# Patient Record
Sex: FEMALE | Race: OTHER | Employment: OTHER | URBAN - METROPOLITAN AREA
[De-identification: names, ages, dates, MRNs, and addresses within clinical notes are randomized per-mention and may not be internally consistent; named-entity substitution may affect disease eponyms.]

---

## 2019-10-21 ENCOUNTER — OFFICE VISIT (OUTPATIENT)
Dept: FAMILY MEDICINE CLINIC | Age: 24
End: 2019-10-21

## 2019-10-21 VITALS
RESPIRATION RATE: 18 BRPM | OXYGEN SATURATION: 98 % | SYSTOLIC BLOOD PRESSURE: 99 MMHG | HEIGHT: 66 IN | WEIGHT: 225 LBS | BODY MASS INDEX: 36.16 KG/M2 | DIASTOLIC BLOOD PRESSURE: 67 MMHG | HEART RATE: 78 BPM | TEMPERATURE: 98.4 F

## 2019-10-21 DIAGNOSIS — E66.09 OBESITY DUE TO EXCESS CALORIES WITHOUT SERIOUS COMORBIDITY, UNSPECIFIED CLASSIFICATION: ICD-10-CM

## 2019-10-21 DIAGNOSIS — J06.9 VIRAL URI: Primary | ICD-10-CM

## 2019-10-21 DIAGNOSIS — Z76.89 ESTABLISHING CARE WITH NEW DOCTOR, ENCOUNTER FOR: ICD-10-CM

## 2019-10-21 NOTE — PATIENT INSTRUCTIONS
Claritin 1 tab daily. Flonase 1 spray in each nostril daily. Mucinex 1 tab daily. Drink hot water with local honey melted in before bed. Viral Respiratory Infection: Care Instructions  Your Care Instructions    Viruses are very small organisms. They grow in number after they enter your body. There are many types that cause different illnesses, such as colds and the mumps. The symptoms of a viral respiratory infection often start quickly. They include a fever, sore throat, and runny nose. You may also just not feel well. Or you may not want to eat much. Most viral respiratory infections are not serious. They usually get better with time and self-care. Antibiotics are not used to treat a viral infection. That's because antibiotics will not help cure a viral illness. In some cases, antiviral medicine can help your body fight a serious viral infection. Follow-up care is a key part of your treatment and safety. Be sure to make and go to all appointments, and call your doctor if you are having problems. It's also a good idea to know your test results and keep a list of the medicines you take. How can you care for yourself at home? · Rest as much as possible until you feel better. · Be safe with medicines. Take your medicine exactly as prescribed. Call your doctor if you think you are having a problem with your medicine. You will get more details on the specific medicine your doctor prescribes. · Take an over-the-counter pain medicine, such as acetaminophen (Tylenol), ibuprofen (Advil, Motrin), or naproxen (Aleve), as needed for pain and fever. Read and follow all instructions on the label. Do not give aspirin to anyone younger than 20. It has been linked to Reye syndrome, a serious illness. · Drink plenty of fluids, enough so that your urine is light yellow or clear like water. Hot fluids, such as tea or soup, may help relieve congestion in your nose and throat.  If you have kidney, heart, or liver disease and have to limit fluids, talk with your doctor before you increase the amount of fluids you drink. · Try to clear mucus from your lungs by breathing deeply and coughing. · Gargle with warm salt water once an hour. This can help reduce swelling and throat pain. Use 1 teaspoon of salt mixed in 1 cup of warm water. · Do not smoke or allow others to smoke around you. If you need help quitting, talk to your doctor about stop-smoking programs and medicines. These can increase your chances of quitting for good. To avoid spreading the virus  · Cough or sneeze into a tissue. Then throw the tissue away. · If you don't have a tissue, use your hand to cover your cough or sneeze. Then clean your hand. You can also cough into your sleeve. · Wash your hands often. Use soap and warm water. Wash for 15 to 20 seconds each time. · If you don't have soap and water near you, you can clean your hands with alcohol wipes or gel. When should you call for help? Call your doctor now or seek immediate medical care if:    · You have a new or higher fever.     · Your fever lasts more than 48 hours.     · You have trouble breathing.     · You have a fever with a stiff neck or a severe headache.     · You are sensitive to light.     · You feel very sleepy or confused.    Watch closely for changes in your health, and be sure to contact your doctor if:    · You do not get better as expected. Where can you learn more? Go to http://adelita-leena.info/. Enter I053 in the search box to learn more about \"Viral Respiratory Infection: Care Instructions. \"  Current as of: June 9, 2019  Content Version: 12.2  © 1559-0696 [employer]. Care instructions adapted under license by [employer] (which disclaims liability or warranty for this information).  If you have questions about a medical condition or this instruction, always ask your healthcare professional. Pablo Sims any warranty or liability for your use of this information.

## 2019-10-22 LAB
ALBUMIN SERPL-MCNC: 4.2 G/DL (ref 3.5–5.5)
ALBUMIN/GLOB SERPL: 1.2 {RATIO} (ref 1.2–2.2)
ALP SERPL-CCNC: 132 IU/L (ref 39–117)
ALT SERPL-CCNC: 17 IU/L (ref 0–32)
AST SERPL-CCNC: 21 IU/L (ref 0–40)
BILIRUB SERPL-MCNC: 0.3 MG/DL (ref 0–1.2)
BUN SERPL-MCNC: 11 MG/DL (ref 6–20)
BUN/CREAT SERPL: 14 (ref 9–23)
CALCIUM SERPL-MCNC: 9.6 MG/DL (ref 8.7–10.2)
CHLORIDE SERPL-SCNC: 102 MMOL/L (ref 96–106)
CHOLEST SERPL-MCNC: 138 MG/DL (ref 100–199)
CO2 SERPL-SCNC: 25 MMOL/L (ref 20–29)
CREAT SERPL-MCNC: 0.78 MG/DL (ref 0.57–1)
ERYTHROCYTE [DISTWIDTH] IN BLOOD BY AUTOMATED COUNT: 12.4 % (ref 12.3–15.4)
EST. AVERAGE GLUCOSE BLD GHB EST-MCNC: 97 MG/DL
GLOBULIN SER CALC-MCNC: 3.4 G/DL (ref 1.5–4.5)
GLUCOSE SERPL-MCNC: 82 MG/DL (ref 65–99)
HBA1C MFR BLD: 5 % (ref 4.8–5.6)
HCT VFR BLD AUTO: 42.3 % (ref 34–46.6)
HDLC SERPL-MCNC: 36 MG/DL
HGB BLD-MCNC: 14 G/DL (ref 11.1–15.9)
INTERPRETATION, 910389: NORMAL
LDLC SERPL CALC-MCNC: 81 MG/DL (ref 0–99)
MCH RBC QN AUTO: 27.4 PG (ref 26.6–33)
MCHC RBC AUTO-ENTMCNC: 33.1 G/DL (ref 31.5–35.7)
MCV RBC AUTO: 83 FL (ref 79–97)
PLATELET # BLD AUTO: 354 X10E3/UL (ref 150–450)
POTASSIUM SERPL-SCNC: 5 MMOL/L (ref 3.5–5.2)
PROT SERPL-MCNC: 7.6 G/DL (ref 6–8.5)
RBC # BLD AUTO: 5.11 X10E6/UL (ref 3.77–5.28)
SODIUM SERPL-SCNC: 139 MMOL/L (ref 134–144)
TRIGL SERPL-MCNC: 106 MG/DL (ref 0–149)
VLDLC SERPL CALC-MCNC: 21 MG/DL (ref 5–40)
WBC # BLD AUTO: 9 X10E3/UL (ref 3.4–10.8)

## 2019-10-26 DIAGNOSIS — R74.8 ELEVATED ALKALINE PHOSPHATASE LEVEL: Primary | ICD-10-CM

## 2019-11-26 ENCOUNTER — HOSPITAL ENCOUNTER (OUTPATIENT)
Dept: LAB | Age: 24
Discharge: HOME | End: 2019-11-26

## 2019-11-26 ENCOUNTER — LAB ONLY (OUTPATIENT)
Dept: FAMILY MEDICINE CLINIC | Age: 24
End: 2019-11-26

## 2019-11-26 DIAGNOSIS — R74.8 ELEVATED ALKALINE PHOSPHATASE LEVEL: ICD-10-CM

## 2019-11-26 LAB
ALBUMIN SERPL-MCNC: 3.5 G/DL (ref 3.5–5)
ALBUMIN/GLOB SERPL: 0.9 {RATIO} (ref 1.1–2.2)
ALP SERPL-CCNC: 138 U/L (ref 45–117)
ALT SERPL-CCNC: 35 U/L (ref 12–78)
ANION GAP SERPL CALC-SCNC: 5 MMOL/L (ref 5–15)
AST SERPL-CCNC: 23 U/L (ref 15–37)
BILIRUB SERPL-MCNC: 0.4 MG/DL (ref 0.2–1)
BUN SERPL-MCNC: 10 MG/DL (ref 6–20)
BUN/CREAT SERPL: 15 (ref 12–20)
CALCIUM SERPL-MCNC: 9.2 MG/DL (ref 8.5–10.1)
CHLORIDE SERPL-SCNC: 105 MMOL/L (ref 97–108)
CO2 SERPL-SCNC: 28 MMOL/L (ref 21–32)
CREAT SERPL-MCNC: 0.67 MG/DL (ref 0.55–1.02)
GLOBULIN SER CALC-MCNC: 4.1 G/DL (ref 2–4)
GLUCOSE SERPL-MCNC: 84 MG/DL (ref 65–100)
POTASSIUM SERPL-SCNC: 4 MMOL/L (ref 3.5–5.1)
PROT SERPL-MCNC: 7.6 G/DL (ref 6.4–8.2)
SODIUM SERPL-SCNC: 138 MMOL/L (ref 136–145)

## 2019-12-24 ENCOUNTER — OFFICE VISIT (OUTPATIENT)
Dept: FAMILY MEDICINE CLINIC | Age: 24
End: 2019-12-24

## 2019-12-24 ENCOUNTER — HOSPITAL ENCOUNTER (OUTPATIENT)
Dept: LAB | Age: 24
Discharge: HOME | End: 2019-12-24
Payer: OTHER GOVERNMENT

## 2019-12-24 ENCOUNTER — HOSPITAL ENCOUNTER (OUTPATIENT)
Dept: LAB | Age: 24
Discharge: HOME | End: 2019-12-24

## 2019-12-24 VITALS
HEART RATE: 87 BPM | RESPIRATION RATE: 18 BRPM | DIASTOLIC BLOOD PRESSURE: 70 MMHG | SYSTOLIC BLOOD PRESSURE: 103 MMHG | HEIGHT: 66 IN | BODY MASS INDEX: 36.8 KG/M2 | OXYGEN SATURATION: 98 % | WEIGHT: 229 LBS | TEMPERATURE: 98.6 F

## 2019-12-24 DIAGNOSIS — Z23 ENCOUNTER FOR IMMUNIZATION: ICD-10-CM

## 2019-12-24 DIAGNOSIS — R74.8 ELEVATED ALKALINE PHOSPHATASE LEVEL: ICD-10-CM

## 2019-12-24 DIAGNOSIS — E66.9 OBESITY, UNSPECIFIED CLASSIFICATION, UNSPECIFIED OBESITY TYPE, UNSPECIFIED WHETHER SERIOUS COMORBIDITY PRESENT: ICD-10-CM

## 2019-12-24 DIAGNOSIS — Z01.419 WELL WOMAN EXAM WITH ROUTINE GYNECOLOGICAL EXAM: Primary | ICD-10-CM

## 2019-12-24 DIAGNOSIS — Z11.3 SCREENING FOR STD (SEXUALLY TRANSMITTED DISEASE): ICD-10-CM

## 2019-12-24 LAB
25(OH)D3 SERPL-MCNC: 13.5 NG/ML (ref 30–100)
ALP SERPL-CCNC: 142 U/L (ref 45–117)
CALCIUM SERPL-MCNC: 9.3 MG/DL (ref 8.5–10.1)
COMMENT, HOLDF: NORMAL
HBV SURFACE AG SER QL: <0.1 INDEX
HBV SURFACE AG SER QL: NEGATIVE
HIV 1+2 AB+HIV1 P24 AG SERPL QL IA: NONREACTIVE
HIV12 RESULT COMMENT, HHIVC: NORMAL
PTH-INTACT SERPL-MCNC: 54.8 PG/ML (ref 18.4–88)
RPR SER-TITR: ABNORMAL {TITER}
SAMPLES BEING HELD,HOLD: NORMAL
TSH SERPL DL<=0.05 MIU/L-ACNC: 2.71 UIU/ML (ref 0.36–3.74)

## 2019-12-24 PROCEDURE — 88175 CYTOPATH C/V AUTO FLUID REDO: CPT

## 2019-12-24 NOTE — PROGRESS NOTES
Identified Patient with two Patient identifiers (Name and ). Two Patient Identifiers confirmed. Reviewed record in preparation for visit and have obtained necessary documentation. Chief Complaint   Patient presents with    Well Woman     patient due for pap       Visit Vitals  /70 (BP 1 Location: Right arm, BP Patient Position: Sitting)   Pulse 87   Temp 98.6 °F (37 °C) (Oral)   Resp 18   Ht 5' 6\" (1.676 m)   Wt 229 lb (103.9 kg)   SpO2 98%   BMI 36.96 kg/m²       1. Have you been to the ER, urgent care clinic since your last visit? Hospitalized since your last visit? No    2. Have you seen or consulted any other health care providers outside of the [address] [place] [address] since your last visit? Include any pap smears or colon screening.  No

## 2019-12-24 NOTE — PROGRESS NOTES
HPI:  Leo Chavez is a 25 y.o. female presenting for well woman exam.     Anal pain x 3days. Had diarrhea x2 of those days. Thinks it was something she had to eat. No blood in the stool. Resolved yesterday. No pain since. Has had elevated alk phos this year. Had cholecystectomy for gallbladder stones about 1 year ago. 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  dicyclomine (BENTYL) 20 mg tablet Take 1 Tab by mouth every six (6) hours as needed (abdominal cramps) for up to 20 doses. No current facility-administered medications for this visit. Past Medical History- reviewed:  History reviewed. No pertinent past medical history.     Past Surgical History- reviewed:   Past Surgical History:   Procedure Laterality Date    HX WISDOM TEETH EXTRACTION  12/2011     Family History - reviewed:  Family History   Problem Relation Age of Onset    Diabetes Mother     Heart Disease Maternal Aunt      Social History - reviewed:  Social History     Socioeconomic History    Marital status:      Spouse name: Not on file    Number of children: Not on file    Years of education: Not on file    Highest education level: Not on file   Occupational History    Not on file   Social Needs    Financial resource strain: Not on file    Food insecurity:     Worry: Not on file     Inability: Not on file    Transportation needs:     Medical: Not on file     Non-medical: Not on file   Tobacco Use    Smoking status: Never Smoker    Smokeless tobacco: Never Used   Substance and Sexual Activity    Alcohol use: No    Drug use: No    Sexual activity: Yes     Partners: Male   Lifestyle    Physical activity:     Days per week: Not on file     Minutes per session: Not on file    Stress: Not on file   Relationships    Social connections:     Talks on phone: Not on file     Gets together: Not on file     Attends Christianity service: Not on file     Active member of club or organization: Not on file     Attends meetings of clubs or organizations: Not on file     Relationship status: Not on file    Intimate partner violence:     Fear of current or ex partner: Not on file     Emotionally abused: Not on file     Physically abused: Not on file     Forced sexual activity: Not on file   Other Topics Concern    Not on file   Social History Narrative    Not on file     Immunizations - reviewed:   Immunization History   Administered Date(s) Administered    Hepatitis B Vaccine 10/06/2010    Influenza Nasal Vaccine 09/23/2019    Influenza Vaccine Split 10/06/2010, 11/14/2011    TDAP Vaccine 02/01/2009     Flu: Done  Tdap: Due today  Pneumovax: NI  Zostervax: NI    Health Maintenance reviewed -  Pap smear 22 YO normal, due again  Mammogram no family hx, NI  Colonoscopy Dad had colon cancer in early 46s, NI yet but will figure out exact age to start early screening  DEXA scan NI  HIV testing Interested  Hepatitis C testing [place]  Lung cancer screening NI    Physical Exam  Visit Vitals  /70 (BP 1 Location: Right arm, BP Patient Position: Sitting)   Pulse 87   Temp 98.6 °F (37 °C) (Oral)   Resp 18   Ht 5' 6\" (1.676 m)   Wt 229 lb (103.9 kg)   LMP 12/10/2019 (Exact Date)   SpO2 98%   BMI 36.96 kg/m²     General appearance - WNWD, NAD  Eyes -no scleral icterus  HEENT - TMs WNL, no rhinorrhea, moist mucosa, pharynx nml  Neck - no cervicalLAD  Chest - CTA, no wheezes/ ronchi/ rales  Heart - RRR, no murmurs  Abdomen - soft, non tender, no guarding or abnormalities  Neurological - no focal deficits  Extremities -no edema  Skin - no jaundice  Pelvic - Exam chaperoned by Sarah Rose LPN. External genitalia normal without rashes or lesions. Pink and moist vaginal mucosa. Scant white discharge. Cervix without lesions or abnormal discharge. Uterus non tender and normal size. No adnexal masses or tenderness. Assessment/Plan:    ICD-10-CM ICD-9-CM    1. Well woman exam with routine gynecological exam Z01.419 V72.31 PAP IG, RFX APTIMA HPV ASCUS ([id number]))   2. Elevated alkaline phosphatase level R74.8 790.5 TSH 3RD GENERATION      GGT      VITAMIN D, 25 HYDROXY      ALK PHOS      PTH INTACT      AMB POC URINE PREGNANCY TEST, VISUAL COLOR COMPARISON      CANCELED: ALK PHOS   3. Screening for STD (sexually transmitted disease) Z11.3 V74.5 HIV 1/2 AG/AB, 4TH GENERATION,W RFLX CONFIRM      HEP B SURFACE AG      HEPATITIS C AB      CHLAMYDIA/GC PCR   4. Obesity, unspecified classification, unspecified obesity type, unspecified whether serious comorbidity present E66.9 278.00      Well Woman   - PAP today  - Desires STD testing - HepB/ C, RPR, HIV, RPR, G/C  - Elevated Alk Phos - Check UPT, TSH, PTH, Vit D, GGT, Alk Phos again  - Obesity - Discussed diet/ lifestyle modifications  - Tdap today (need more complete records to see if she has had HPV)    I have discussed the diagnosis with the patient and the intended plan as seen in the above orders. Patient verbalized understanding of the plan and agrees with the plan. The patient has received an after-visit summary and questions were answered concerning future plans.   I have discussed medication side effects and warnings with the patient as well. Informed patient to return to the office if new symptoms arise.     Patient discussed with Dr. Helen Chin MD (Attending)    Amita Combs DO  Family Medicine Resident

## 2019-12-25 LAB
HCV AB SERPL QL IA: NONREACTIVE
HCV COMMENT,HCGAC: NORMAL

## 2019-12-26 LAB
RPR SER QL: REACTIVE
T PALLIDUM AB SER QL IA: NON REACTIVE

## 2019-12-26 NOTE — PROGRESS NOTES
Hep B/C neg, HIV neg, RPR positive awaiting treponema testing for confirmation, all phos higher but stable, Vitamin d low, pth/ calcium WNL, TSH WNL

## 2019-12-28 LAB
C TRACH RRNA SPEC QL NAA+PROBE: POSITIVE
N GONORRHOEA RRNA SPEC QL NAA+PROBE: NEGATIVE
SPECIMEN SOURCE: ABNORMAL

## 2019-12-30 ENCOUNTER — TELEPHONE (OUTPATIENT)
Dept: FAMILY MEDICINE CLINIC | Age: 24
End: 2019-12-30

## 2019-12-30 RX ORDER — ACETAMINOPHEN 500 MG
2000 TABLET ORAL DAILY
Qty: 30 CAP | Refills: 2 | Status: SHIPPED | OUTPATIENT
Start: 2019-12-30 | End: 2020-09-14 | Stop reason: SDUPTHER

## 2019-12-30 RX ORDER — AZITHROMYCIN 500 MG/1
1000 TABLET, FILM COATED ORAL ONCE
Qty: 2 TAB | Refills: 0 | Status: SHIPPED | OUTPATIENT
Start: 2019-12-30 | End: 2019-12-30

## 2019-12-30 NOTE — TELEPHONE ENCOUNTER
12/30/2019 1:51 PM    Called patient to discuss positive Chlamydia testing. Discussed this is a reportable disease to health department. Instructed to take Azithro and needs to have other partners tested and treated (she is ). Discussed false positive RPR. May need repeat Tpal. Would recommend investigation of this further with HILDA, etc. Will schedule appt with . Discussed low Vitamin D. Will treat. Needs rechecked in 6 weeks.      Nohemy Staley, DO

## 2020-01-05 NOTE — PROGRESS NOTES
HPI       Chief Complaint: Discuss lab results     Tejal Shea is a 25 y.o. female who presents to discuss lab results done during routine well woman exam.     Chlamydia positive - rx'd izaiahro on 12/30. Pt reports her  was also tested for full panel of STDs including chlamydia and was negative. Pt denies prior history of STD. RPR positive (nontreponemal) - but Tpall (treponema confirmation testing) negative. Pt denies any sexual partners besides her . Per UTD algorithm, can consider investigating for causes of false-positive non-treponemal result (which include recent infection, pregnancy, HIV, chronic liver disease, autoimmune diseases, etc). Pt reports she had had pneumonia within a month of the labs and a UTI at the time of the lab draws. No family hx of any rheumatologic disorders. Does have chronically elevated alk phos, and chronic liver disease can predispose to false-positive nontreponemal tests. No rashes, joint pains, photosensitivity, fever, LE swelling but does endorse fatigue, hair loss. Pt does have persistently elevated alk phos noted since 10/2019. PMHx:  No past medical history on file. Meds:   Current Outpatient Medications   Medication Sig Dispense Refill    cholecalciferol (VITAMIN D3) (2,000 UNITS /50 MCG) cap capsule Take 2,000 Units by mouth daily. 30 Cap 2    ondansetron (ZOFRAN ODT) 4 mg disintegrating tablet Take 1 Tab by mouth every eight (8) hours as needed for Nausea. 10 Tab 0    dicyclomine (BENTYL) 20 mg tablet Take 1 Tab by mouth every six (6) hours as needed (abdominal cramps) for up to 20 doses. 20 Tab 0     Allergies: Allergies   Allergen Reactions    Motrin [Ibuprofen] Hives    Nsaids (Non-Steroidal Anti-Inflammatory Drug) Hives     ROS  Review of Systems   Constitutional: Positive for malaise/fatigue. Negative for chills and fever. Respiratory: Negative for shortness of breath and wheezing.     Cardiovascular: Negative for chest pain and palpitations. Gastrointestinal: Negative for abdominal pain, constipation, diarrhea, nausea and vomiting. Genitourinary: Negative for dysuria, frequency and urgency. Neurological: Negative for dizziness, weakness and headaches. Physical Exam:  Visit Vitals  /72   Pulse 85   Temp 99 °F (37.2 °C) (Oral)   Resp 16   Ht 5' 6\" (1.676 m)   Wt 230 lb (104.3 kg)   LMP 12/10/2019 (Exact Date)   SpO2 97%   BMI 37.12 kg/m²       Wt Readings from Last 3 Encounters:   01/06/20 230 lb (104.3 kg)   12/24/19 229 lb (103.9 kg)   10/21/19 225 lb (102.1 kg)     BP Readings from Last 3 Encounters:   01/06/20 100/72   12/24/19 103/70   10/21/19 99/67      Physical Exam  Vitals signs reviewed. Constitutional:       Appearance: She is well-developed. HENT:      Head: Normocephalic and atraumatic. Neck:      Musculoskeletal: Normal range of motion and neck supple. Thyroid: No thyromegaly. Cardiovascular:      Rate and Rhythm: Normal rate and regular rhythm. Heart sounds: No murmur. Pulmonary:      Effort: Pulmonary effort is normal. No respiratory distress. Breath sounds: Normal breath sounds. No wheezing or rales. Abdominal:      General: Bowel sounds are normal. There is no distension. Palpations: Abdomen is soft. Tenderness: There is no tenderness. Skin:     General: Skin is warm and dry. Neurological:      Mental Status: She is alert and oriented to person, place, and time. I personally reviewed the following lab results:   Recent Results (from the past 12 hour(s))   AMB POC URINE PREGNANCY TEST, VISUAL COLOR COMPARISON    Collection Time: 01/06/20  4:11 PM   Result Value Ref Range    VALID INTERNAL CONTROL POC Yes     HCG urine, Ql. (POC) Negative Negative             Assessment     25 y.o. female with:    ICD-10-CM ICD-9-CM    1.  Abnormal laboratory test result R89.9 796.4 AMB POC URINE PREGNANCY TEST, VISUAL COLOR COMPARISON   2. Elevated alkaline phosphatase level R74.8 790.5               Plan     1. Abnormal laboratory test result  Discussed the false-positive non-treponemal test for syphilis. Discussed repeating the RPR in 6 mos vs investigating other causes for false-positive today. Other etiologies for false-positive include pregnancy (UPT neg today), acute event/infection (pt had UTI at time of lab draw and had recently had PNA), recent immunization (had flu vaccine 3 mos prior), autoimmune disorders (no family hx or personal hx), IV drug use (no hx), HIV (pt neg), or chronic liver disease. Pt does have elevated alk phos (See below). Plan to recheck RPR in 6 mos. - AMB POC URINE PREGNANCY TEST, VISUAL COLOR COMPARISON    2. Elevated alkaline phosphatase level  Will have GGT (ordered previously by Dr. Sangeeta Brown) drawn today. Hep B and C recently negative. Will plan to recheck CMP in about 1 mo. Consider US/hep panel if still elevated and GGT points to hepatic etiology. Follow-up and Dispositions    · Return in about 3 months (around 4/6/2020), or repeat liver enzymes . Patient discussed with Dr. Pippa Lee (Attending Physician)    I have discussed the diagnosis with the patient and the intended plan as seen in the above orders. The patient has received an after-visit summary and questions were answered concerning future plans. I have discussed medication side effects and warnings with the patient as well.     Emy Gomes MD  Family Medicine Resident  PGY-3

## 2020-01-06 ENCOUNTER — HOSPITAL ENCOUNTER (OUTPATIENT)
Dept: LAB | Age: 25
Discharge: HOME | End: 2020-01-06

## 2020-01-06 ENCOUNTER — OFFICE VISIT (OUTPATIENT)
Dept: FAMILY MEDICINE CLINIC | Age: 25
End: 2020-01-06

## 2020-01-06 VITALS
HEIGHT: 66 IN | TEMPERATURE: 99 F | BODY MASS INDEX: 36.96 KG/M2 | HEART RATE: 85 BPM | RESPIRATION RATE: 16 BRPM | DIASTOLIC BLOOD PRESSURE: 72 MMHG | SYSTOLIC BLOOD PRESSURE: 100 MMHG | OXYGEN SATURATION: 97 % | WEIGHT: 230 LBS

## 2020-01-06 DIAGNOSIS — R89.9 ABNORMAL LABORATORY TEST RESULT: Primary | ICD-10-CM

## 2020-01-06 DIAGNOSIS — R74.8 ELEVATED ALKALINE PHOSPHATASE LEVEL: ICD-10-CM

## 2020-01-06 LAB
HCG URINE, QL. (POC): NEGATIVE
VALID INTERNAL CONTROL?: YES

## 2020-01-06 NOTE — PROGRESS NOTES
Chief Complaint   Patient presents with    Abnormal Lab Results     1. Have you been to the ER, urgent care clinic since your last visit? Hospitalized since your last visit? No    2. Have you seen or consulted any other health care providers outside of the [address] [place] [address] since your last visit? Include any pap smears or colon screening.  No

## 2020-01-07 LAB — GGT SERPL-CCNC: 43 U/L (ref 5–55)

## 2020-01-15 ENCOUNTER — TELEPHONE (OUTPATIENT)
Dept: FAMILY MEDICINE CLINIC | Age: 25
End: 2020-01-15

## 2020-01-15 NOTE — TELEPHONE ENCOUNTER
[phone]    Patient called to say she had appt of 1/6 at [facility] and they told her the vitamin D level was 17.9. She said they are not sending the doctor  here a report. She said the  told her to call the doctor here for advice for the next step.

## 2020-01-15 NOTE — TELEPHONE ENCOUNTER
Spoke with Ms. Goncalves [place], she will come by office tomorrow to fill out release of information.

## 2020-06-18 ENCOUNTER — APPOINTMENT (OUTPATIENT)
Dept: GENERAL RADIOLOGY | Age: 25
End: 2020-06-18
Attending: EMERGENCY MEDICINE
Payer: OTHER GOVERNMENT

## 2020-06-18 ENCOUNTER — HOSPITAL ENCOUNTER (EMERGENCY)
Age: 25
Discharge: HOME | End: 2020-06-18
Attending: EMERGENCY MEDICINE
Payer: OTHER GOVERNMENT

## 2020-06-18 VITALS
WEIGHT: 230 LBS | HEIGHT: 65 IN | RESPIRATION RATE: 20 BRPM | DIASTOLIC BLOOD PRESSURE: 83 MMHG | SYSTOLIC BLOOD PRESSURE: 122 MMHG | TEMPERATURE: 98.3 F | OXYGEN SATURATION: 98 % | BODY MASS INDEX: 38.32 KG/M2 | HEART RATE: 90 BPM

## 2020-06-18 DIAGNOSIS — R05.9 COUGH: Primary | ICD-10-CM

## 2020-06-18 PROCEDURE — 71046 X-RAY EXAM CHEST 2 VIEWS: CPT

## 2020-06-18 PROCEDURE — 99281 EMR DPT VST MAYX REQ PHY/QHP: CPT

## 2020-06-18 NOTE — ED TRIAGE NOTES
Patient was exposed to rat feces, someone vacuumed them up and they were in th eair, now she is experiencing congestion and cough

## 2020-06-18 NOTE — ED PROVIDER NOTES
HPI patient is a 58-year-old obese female who presents to the ED reporting chest discomfort cough and chest congestion for 2 days after having a friend move out of her apartment. She states she was exposed to cat feces and is concerned that that is what is causing her symptoms. Denies fever, cold symptoms, headache, neck pain, visual changes, focal weakness or rash. Denies any  difficulty breathing, difficulty swallowing, SOB or abdominal pain. Denies any nausea, vomiting or diarrhea. Pt. Reports taking Zyrtec and Mucinex without relief. No past medical history on file.     Past Surgical History:   Procedure Laterality Date    HX WISDOM TEETH EXTRACTION  12/2011         Family History:   Problem Relation Age of Onset    Diabetes Mother     Heart Disease Maternal Aunt        Social History     Socioeconomic History    Marital status:      Spouse name: Not on file    Number of children: Not on file    Years of education: Not on file    Highest education level: Not on file   Occupational History    Not on file   Social Needs    Financial resource strain: Not on file    Food insecurity     Worry: Not on file     Inability: Not on file    Transportation needs     Medical: Not on file     Non-medical: Not on file   Tobacco Use    Smoking status: Never Smoker    Smokeless tobacco: Never Used   Substance and Sexual Activity    Alcohol use: No    Drug use: No    Sexual activity: Yes     Partners: Male   Lifestyle    Physical activity     Days per week: Not on file     Minutes per session: Not on file    Stress: Not on file   Relationships    Social connections     Talks on phone: Not on file     Gets together: Not on file     Attends [employer] service: Not on file     Active member of club or organization: Not on file     Attends meetings of clubs or organizations: Not on file     Relationship status: Not on file    Intimate partner violence     Fear of current or ex partner: Not on file Emotionally abused: Not on file     Physically abused: Not on file     Forced sexual activity: Not on file   Other Topics Concern    Not on file   Social History Narrative    Not on file         ALLERGIES: Motrin [ibuprofen] and Nsaids (non-steroidal anti-inflammatory drug)    Review of Systems   Constitutional: Negative for activity change, appetite change, fever and unexpected weight change. HENT: Positive for congestion. Negative for rhinorrhea, sore throat and trouble swallowing. Eyes: Negative for visual disturbance. Respiratory: Positive for cough. Negative for choking, chest tightness and shortness of breath. Cardiovascular: Positive for chest pain. Negative for palpitations and leg swelling. Gastrointestinal: Negative for abdominal pain, diarrhea, nausea and vomiting. Genitourinary: Negative for dysuria and flank pain. Musculoskeletal: Negative for arthralgias, back pain, joint swelling, myalgias and neck pain. Skin: Negative for rash. Neurological: Negative for dizziness, light-headedness and headaches. All other systems reviewed and are negative. Vitals:    06/18/20 1030   BP: 122/83   Pulse: 90   Resp: 20   Temp: 98.3 °F (36.8 °C)   SpO2: 98%   Weight: 104.3 kg (230 lb)   Height: 5' 5\" (1.651 m)            Physical Exam  Vitals signs and nursing note reviewed. Constitutional:       General: She is not in acute distress. Appearance: Normal appearance. She is not ill-appearing, toxic-appearing or diaphoretic. HENT:      Head: Normocephalic and atraumatic. Right Ear: Tympanic membrane normal.      Left Ear: Tympanic membrane normal.      Nose: Nose normal. No rhinorrhea. Mouth/Throat:      Mouth: Mucous membranes are moist.      Pharynx: No posterior oropharyngeal erythema. Neck:      Musculoskeletal: Normal range of motion and neck supple. Cardiovascular:      Rate and Rhythm: Normal rate and regular rhythm.    Pulmonary:      Effort: Pulmonary effort is normal.      Breath sounds: Normal breath sounds. Chest:      Chest wall: No tenderness. Abdominal:      General: Bowel sounds are normal.      Palpations: Abdomen is soft. Tenderness: There is no abdominal tenderness. There is no guarding or rebound. Musculoskeletal: Normal range of motion. General: No tenderness. Lymphadenopathy:      Cervical: No cervical adenopathy. Skin:     General: Skin is warm and dry. Findings: No rash. Neurological:      General: No focal deficit present. Mental Status: She is alert and oriented to person, place, and time. Psychiatric:         Mood and Affect: Mood normal.         Behavior: Behavior normal.          MDM       Procedures      Patient has been reexamined and denies any complaints of pain or discomfort. Recommend close follow-up with PCP if symptoms persist.  4:27 PM  Patient's results and plan of care have been reviewed with her. Patient has verbally conveyed her understanding and agreement of her signs, symptoms, diagnosis, treatment and prognosis and additionally agrees to follow up as recommended or return to the Emergency Room should her condition change prior to follow-up. Discharge instructions have also been provided to the patient with some educational information regarding her diagnosis as well a list of reasons why she would want to return to the ER prior to her follow-up appointment should her condition change. Tanner Mejía NP

## 2020-06-18 NOTE — DISCHARGE INSTRUCTIONS
Patient Education        Cough: Care Instructions  Your Care Instructions     A cough is your body's response to something that bothers your throat or airways. Many things can cause a cough. You might cough because of a cold or the flu, bronchitis, or asthma. Smoking, postnasal drip, allergies, and stomach acid that backs up into your throat also can cause coughs. A cough is a symptom, not a disease. Most coughs stop when the cause, such as a cold, goes away. You can take a few steps at home to cough less and feel better. Follow-up care is a key part of your treatment and safety. Be sure to make and go to all appointments, and call your doctor if you are having problems. It's also a good idea to know your test results and keep a list of the medicines you take. How can you care for yourself at home? · Drink lots of water and other fluids. This helps thin the mucus and soothes a dry or sore throat. Honey or lemon juice in hot water or tea may ease a dry cough. · Take cough medicine as directed by your doctor. · Prop up your head on pillows to help you breathe and ease a dry cough. · Try cough drops to soothe a dry or sore throat. Cough drops don't stop a cough. Medicine-flavored cough drops are no better than candy-flavored drops or hard candy. · Do not smoke. Avoid secondhand smoke. If you need help quitting, talk to your doctor about stop-smoking programs and medicines. These can increase your chances of quitting for good. When should you call for help? [id number] anytime you think you may need emergency care. For example, call if:  · You have severe trouble breathing. Call your doctor now or seek immediate medical care if:  · You cough up blood. · You have new or worse trouble breathing. · You have a new or higher fever. · You have a new rash.   Watch closely for changes in your health, and be sure to contact your doctor if:  · You cough more deeply or more often, especially if you notice more mucus or a change in the color of your mucus. · You have new symptoms, such as a sore throat, an earache, or sinus pain. · You do not get better as expected. Where can you learn more? Go to http://adelita-leena.info/  Enter D279 in the search box to learn more about \"Cough: Care Instructions. \"  Current as of: February 24, 2020               Content Version: 12.5  © 2006-2020 [employer]. Care instructions adapted under license by [employer] (which disclaims liability or warranty for this information). If you have questions about a medical condition or this instruction, always ask your healthcare professional. [address] any warranty or liability for your use of this information.

## 2020-06-19 ENCOUNTER — PATIENT OUTREACH (OUTPATIENT)
Dept: FAMILY MEDICINE CLINIC | Age: 25
End: 2020-06-19

## 2020-09-14 ENCOUNTER — VIRTUAL VISIT (OUTPATIENT)
Dept: FAMILY MEDICINE CLINIC | Age: 25
End: 2020-09-14
Payer: OTHER GOVERNMENT

## 2020-09-14 DIAGNOSIS — L68.0 HIRSUTISM: ICD-10-CM

## 2020-09-14 DIAGNOSIS — E66.9 OBESITY, UNSPECIFIED CLASSIFICATION, UNSPECIFIED OBESITY TYPE, UNSPECIFIED WHETHER SERIOUS COMORBIDITY PRESENT: ICD-10-CM

## 2020-09-14 DIAGNOSIS — E55.9 VITAMIN D DEFICIENCY: ICD-10-CM

## 2020-09-14 DIAGNOSIS — L70.0 ACNE VULGARIS: Primary | ICD-10-CM

## 2020-09-14 DIAGNOSIS — A53.0 POSITIVE RPR TEST: ICD-10-CM

## 2020-09-14 DIAGNOSIS — R74.8 ELEVATED ALKALINE PHOSPHATASE LEVEL: ICD-10-CM

## 2020-09-14 PROCEDURE — 99214 OFFICE O/P EST MOD 30 MIN: CPT | Performed by: STUDENT IN AN ORGANIZED HEALTH CARE EDUCATION/TRAINING PROGRAM

## 2020-09-14 RX ORDER — SPIRONOLACTONE 25 MG/1
25 TABLET ORAL 2 TIMES DAILY
Qty: 60 TAB | Refills: 1 | Status: SHIPPED | OUTPATIENT
Start: 2020-09-14 | End: 2020-10-02

## 2020-09-14 RX ORDER — ACETAMINOPHEN 500 MG
2000 TABLET ORAL DAILY
Qty: 30 CAP | Refills: 2 | Status: SHIPPED | OUTPATIENT
Start: 2020-09-14 | End: 2021-10-27 | Stop reason: ALTCHOICE

## 2020-09-14 RX ORDER — SPIRONOLACTONE 25 MG/1
25 TABLET ORAL DAILY
Qty: 30 TAB | Refills: 1 | Status: SHIPPED | OUTPATIENT
Start: 2020-09-14 | End: 2020-09-14

## 2020-09-14 NOTE — PROGRESS NOTES
[address] Medicine Residency Attending Addendum:  Dr. Donnie Buchanan MD,  the patient and I were not physically present during this encounter. The resident and I are concurrently monitoring the patient care through appropriate telecommunication technology. I discussed the findings, assessment and plan with the resident and agree with the resident's findings and plan as documented in the resident's note.       Lino Mosquera MD

## 2020-09-14 NOTE — PROGRESS NOTES
Merrilee Cranker  25 y.o. female  1995  [address] [facility] [address]  [id number]   [id number] [address]:    Telemedicine Progress Note  Georges Hernandez MD       Encounter Date and Time: September 14, 2020 at 8:06 AM    Consent:  She and/or the health care decision maker is aware that that she may receive a bill for this telephone service, depending on her insurance coverage, and has provided verbal consent to proceed: Yes    Chief Complaint   Patient presents with    Acne     History of Present Illness   Merrilee Cranker is a 25 y.o. female was evaluated by synchronous (real-time) audio-video technology from home. Pt reports has been having trouble with acne, hirsutism, and weight gain. Complains of excess hair grown on her chin, arm pits, and legs \"it's pretty bothersome\". Is exercising, but having trouble losing weight. Reports Spironolactone worked well for her facial hair before, says was prescribed at [facility]" in [place]. Also requests refill of Sulfacetamide acne foam prescription, pt has this medication to show on the video. Says it works well for her acne. Reports regular periods, LMP a week ago. Patient also due for some labs which she has not gotten done yet, and asks for refill of vitamin D, ran out a month or two ago. Not currently using birth control or protection, says \" cant have kids\" is sterile from cancer.          Patient Screening for COVID-19:     1) Patient denies complaints of shortness of breath or difficulty breathing, sore throat,  chills, fatigue, muscle aches, loss of taste or smell, or GI symptoms(nausea, vomiting or diarrhea): Yes    2) Patient denies complaints of cough or fever over 100F: Yes    3) Patient denies leaving the country in the past 14 days or any other recent travel: Yes    4) Patient denies being exposed to anyone with COVID-19 and has not been around any one that has been sick with COVID-19 like symptoms as listed above: Yes     5) Patient informed to wear mask when arriving for appointment: Yes        Review of Systems   Review of Systems   Constitutional: Negative for chills and fever. Respiratory: Negative for cough and shortness of breath. Gastrointestinal: Negative for abdominal pain, nausea and vomiting. Skin:        Facial acne   Neurological: Negative for dizziness and headaches. Endo/Heme/Allergies:        Excessive facial hair, armpit hair, and leg hair       Vitals/Objective:     General: alert, cooperative, no distress   Mental  status: mental status: alert, oriented to person, place, and time, normal mood, behavior, speech, dress, motor activity, and thought processes   Resp: resp: normal effort and no respiratory distress   Neuro: neuro: no gross deficits   Skin: skin: LESIONS NOTED: acne lesions noted on pt's face   Due to this being a TeleHealth evaluation, many elements of the physical examination are unable to be assessed. Assessment and Plan:   Time-based coding, delete if not needed: I spent at least 15 minutes with this established patient, and >50% of the time was spent counseling and/or coordinating care regarding acne and hirsutism    Marleni Mcneil is a 25 y.o. female with acne and hirsutism, in need of labs    1. Acne vulgaris - pt says is well controlled with sulfacetamide foam, requests refill  -REFILLED sulfacetamide sodium-sulfur 10-5 % topical foam; Apply  to affected area two (2) times a day. Dispense: 60 g; Refill: 1    2. Hirsutism - uncontrolled, excess hair growth on face, arm pits, and legs, pt reports was prescribed spironolactone in the past which worked well for her, will represcribe and check testosterone to check for PCOS as pt with hx of Obesity and trouble losing weight, pt says she is aware of what PCOS is and does not think she has been tested for it before  - TESTOSTERONE  - spironolactone (ALDACTONE) 25 mg tablet; Take 1 Tab by mouth two (2) times a day.   Dispense: 60 Tab; Refill: 1    3. Positive RPR test - hx of positive RPR test and negative T pallidum AB, per chart review at last visit 1/6/20 pt was to get repeat RPR in 6 months, pt will call to schedule a lab appointment  - RPR    4. Elevated alkaline phosphatase level  - METABOLIC PANEL, COMPREHENSIVE; Future    5. Vitamin D deficiency - last level low at 13.5  Lab Results   Component Value Date/Time    Vitamin D 25-Hydroxy 13.5 (L) 12/24/2019 11:34 AM       - VITAMIN D, 25 HYDROXY; Future  -REFILLED cholecalciferol (VITAMIN D3) (2,000 UNITS /50 MCG) cap capsule; Take 2,000 Units by mouth daily. Dispense: 30 Cap; Refill: 2        Time spent in direct conversation with the patient to include medical condition(s) discussed, assessment and treatment plan:       We discussed the expected course, resolution and complications of the diagnosis(es) in detail. Medication risks, benefits, costs, interactions, and alternatives were discussed as indicated. I advised her to contact the office if her condition worsens, changes or fails to improve as anticipated. She expressed understanding with the diagnosis(es) and plan. Patient understands that this encounter was a temporary measure, and the importance of further follow up and examination was emphasized. Patient verbalized understanding. Patient informed to follow up: pt to call to schedule lab appointment   Follow-up and Dispositions  ·   Return in about 1 month (around 10/14/2020), or if symptoms worsen or fail to improve, for Acne, Hirsutism . Electronically Signed: Jerry Newman MD    Providers location when delivering service (clinic, hospital, home): Home      ICD-10-CM ICD-9-CM    1. Acne vulgaris  L70.0 706.1 sulfacetamide sodium-sulfur 10-5 % topical foam      spironolactone (ALDACTONE) 25 mg tablet      DISCONTINUED: spironolactone (ALDACTONE) 25 mg tablet      CANCELED: TESTOSTERONE, FREE & TOTAL   2.  Hirsutism  L68.0 704.1 spironolactone (ALDACTONE) 25 mg tablet      TESTOSTERONE, TOTAL, FEMALE/CHILD      DISCONTINUED: spironolactone (ALDACTONE) 25 mg tablet      CANCELED: TESTOSTERONE, FREE & TOTAL   3. Obesity, unspecified classification, unspecified obesity type, unspecified whether serious comorbidity present  E66.9 278.00 TESTOSTERONE, TOTAL, FEMALE/CHILD      CANCELED: TESTOSTERONE, FREE & TOTAL   4. Positive RPR test  A53.0 097.1 RPR   5. Elevated alkaline phosphatase level  F04.1 766.5 METABOLIC PANEL, COMPREHENSIVE   6. Vitamin D deficiency  E55.9 268.9 VITAMIN D, 25 HYDROXY      cholecalciferol (VITAMIN D3) (2,000 UNITS /50 MCG) cap capsule           Pursuant to the emergency declaration under the [address] [place] [address] waiver authority and the [employer] Act, this Virtual  Visit was conducted, with patient's consent, to reduce the patient's risk of exposure to COVID-19 and provide continuity of care for an established patient. Services were provided through a video synchronous discussion virtually to substitute for in-person clinic visit. History   Patients past medical, surgical and family histories were reviewed. No past medical history on file.   Past Surgical History:   Procedure Laterality Date    HX WISDOM TEETH EXTRACTION  12/2011     Family History   Problem Relation Age of Onset    Diabetes Mother     Heart Disease Maternal Aunt      Social History     Socioeconomic History    Marital status:      Spouse name: Not on file    Number of children: Not on file    Years of education: Not on file    Highest education level: Not on file   Occupational History    Not on file   Social Needs    Financial resource strain: Not on file    Food insecurity     Worry: Not on file     Inability: Not on file    Transportation needs     Medical: Not on file     Non-medical: Not on file   Tobacco Use    Smoking status: Never Smoker    Smokeless tobacco: Never Used   Substance and Sexual Activity    Alcohol use: No    Drug use: No    Sexual activity: Yes     Partners: Male   Lifestyle    Physical activity     Days per week: Not on file     Minutes per session: Not on file    Stress: Not on file   Relationships    Social connections     Talks on phone: Not on file     Gets together: Not on file     Attends Protestant service: Not on file     Active member of club or organization: Not on file     Attends meetings of clubs or organizations: Not on file     Relationship status: Not on file    Intimate partner violence     Fear of current or ex partner: Not on file     Emotionally abused: Not on file     Physically abused: Not on file     Forced sexual activity: Not on file   Other Topics Concern    Not on file   Social History Narrative    Not on file     There is no problem list on file for this patient. Current Medications/Allergies   Medications and Allergies reviewed:    Current Outpatient Medications   Medication Sig Dispense Refill    sulfacetamide sodium-sulfur 10-5 % topical foam Apply  to affected area two (2) times a day. 60 g 1    spironolactone (ALDACTONE) 25 mg tablet Take 1 Tab by mouth two (2) times a day. 60 Tab 1    cholecalciferol (VITAMIN D3) (2,000 UNITS /50 MCG) cap capsule Take 2,000 Units by mouth daily.  30 Cap 2     Allergies   Allergen Reactions    Motrin [Ibuprofen] Hives    Nsaids (Non-Steroidal Anti-Inflammatory Drug) Hives

## 2020-09-15 ENCOUNTER — LAB ONLY (OUTPATIENT)
Dept: FAMILY MEDICINE CLINIC | Age: 25
End: 2020-09-15

## 2020-09-15 DIAGNOSIS — R74.8 ELEVATED ALKALINE PHOSPHATASE LEVEL: ICD-10-CM

## 2020-09-15 DIAGNOSIS — E66.9 OBESITY, UNSPECIFIED CLASSIFICATION, UNSPECIFIED OBESITY TYPE, UNSPECIFIED WHETHER SERIOUS COMORBIDITY PRESENT: ICD-10-CM

## 2020-09-15 DIAGNOSIS — E55.9 VITAMIN D DEFICIENCY: ICD-10-CM

## 2020-09-15 DIAGNOSIS — L68.0 HIRSUTISM: ICD-10-CM

## 2020-09-15 DIAGNOSIS — A53.0 POSITIVE RPR TEST: ICD-10-CM

## 2020-09-15 LAB
25(OH)D3 SERPL-MCNC: 22 NG/ML (ref 30–100)
ALBUMIN SERPL-MCNC: 3.7 G/DL (ref 3.5–5)
ALBUMIN/GLOB SERPL: 0.9 {RATIO} (ref 1.1–2.2)
ALP SERPL-CCNC: 135 U/L (ref 45–117)
ALT SERPL-CCNC: 35 U/L (ref 12–78)
ANION GAP SERPL CALC-SCNC: 9 MMOL/L (ref 5–15)
AST SERPL-CCNC: 26 U/L (ref 15–37)
BILIRUB SERPL-MCNC: 0.4 MG/DL (ref 0.2–1)
BUN SERPL-MCNC: 12 MG/DL (ref 6–20)
BUN/CREAT SERPL: 17 (ref 12–20)
CALCIUM SERPL-MCNC: 9.4 MG/DL (ref 8.5–10.1)
CHLORIDE SERPL-SCNC: 105 MMOL/L (ref 97–108)
CO2 SERPL-SCNC: 23 MMOL/L (ref 21–32)
CREAT SERPL-MCNC: 0.71 MG/DL (ref 0.55–1.02)
GLOBULIN SER CALC-MCNC: 3.9 G/DL (ref 2–4)
GLUCOSE SERPL-MCNC: 89 MG/DL (ref 65–100)
POTASSIUM SERPL-SCNC: 4.2 MMOL/L (ref 3.5–5.1)
PROT SERPL-MCNC: 7.6 G/DL (ref 6.4–8.2)
SODIUM SERPL-SCNC: 137 MMOL/L (ref 136–145)

## 2020-09-16 LAB — RPR SER-TITR: ABNORMAL {TITER}

## 2020-09-17 LAB
RPR SER QL: REACTIVE
T PALLIDUM AB SER QL IA: NON REACTIVE

## 2020-09-19 LAB — TESTOST SERPL-MCNC: 32.5 NG/DL (ref 10–55)

## 2020-09-22 ENCOUNTER — TELEPHONE (OUTPATIENT)
Dept: FAMILY MEDICINE CLINIC | Age: 25
End: 2020-09-22

## 2020-09-22 DIAGNOSIS — N92.6 IRREGULAR PERIODS: ICD-10-CM

## 2020-09-22 DIAGNOSIS — L68.0 HIRSUTISM: Primary | ICD-10-CM

## 2020-09-22 DIAGNOSIS — E66.9 OBESITY, UNSPECIFIED CLASSIFICATION, UNSPECIFIED OBESITY TYPE, UNSPECIFIED WHETHER SERIOUS COMORBIDITY PRESENT: ICD-10-CM

## 2020-09-22 NOTE — TELEPHONE ENCOUNTER
Hirsutism is better with treatment, hasn't seen any more facial hair growth. LMP 9/8/10-9/10/20 was about a week late, sometimes irregular periods. Discussed lab results. Testosterone normal. RPR again postivie, but T pal negative. Patient again denies any urinary or vaginal symptoms, denies any genital ulcers, joint pain, fevers, night sweats, weight loss, rash, or oral lesions. Patient would like to pursue additional testing at this time to be sure with the RPR positive again and irregular period. Vit D low, pt to continue supplementation. No concern from pregnancy, says  cannot have kids, is sterile due to cancer treatment. Engaged in shared decision making, pt will come in for follow up labs Friday 9:15, declines to have ultrasound testing done at this time.      Silvia Graham MD  7:59 PM

## 2020-09-23 NOTE — PROGRESS NOTES
Results noted. Testosterone normal. Vit D low, pt to continue vit d supplementation, recently refilled. RPR came back reactive again, t pal negative. Called patient to discuss results, still asymptomatic at this time, engaged in shared decision making and pt would like to pursue additional testing due to RPR positive again and irregular period.     Malou Duvall MD  PGY3 Family Medicine Resident

## 2020-09-23 NOTE — TELEPHONE ENCOUNTER
Scheduled patient for lab appt:  Appointment Information   Name: Charlee Sparks MRN: [id number]    Date: 9/25/2020 Status: [facility]    Time: 9:15 AM Length: 15 [phone]   Visit Type: LAB [[id number]] Copay: $0.00    Provider: LAB [facility]        Close enc

## 2020-09-25 ENCOUNTER — LAB ONLY (OUTPATIENT)
Dept: FAMILY MEDICINE CLINIC | Age: 25
End: 2020-09-25

## 2020-09-25 DIAGNOSIS — L68.0 HIRSUTISM: ICD-10-CM

## 2020-09-25 DIAGNOSIS — E66.9 OBESITY, UNSPECIFIED CLASSIFICATION, UNSPECIFIED OBESITY TYPE, UNSPECIFIED WHETHER SERIOUS COMORBIDITY PRESENT: ICD-10-CM

## 2020-09-25 DIAGNOSIS — N92.6 IRREGULAR PERIODS: ICD-10-CM

## 2020-09-25 LAB
FSH SERPL-ACNC: 2.8 MIU/ML
HCG SERPL-ACNC: <1 MIU/ML (ref 0–6)
HIV 1+2 AB+HIV1 P24 AG SERPL QL IA: NONREACTIVE
HIV12 RESULT COMMENT, HHIVC: NORMAL
LH SERPL-ACNC: 6.4 MIU/ML
TSH SERPL DL<=0.05 MIU/L-ACNC: 3.85 UIU/ML (ref 0.36–3.74)

## 2020-09-26 LAB — PROLACTIN SERPL-MCNC: 10.4 NG/ML

## 2020-09-29 LAB — TESTOST FREE SERPL-MCNC: 3 PG/ML (ref 0–4.2)

## 2020-10-02 ENCOUNTER — TELEPHONE (OUTPATIENT)
Dept: FAMILY MEDICINE CLINIC | Age: 25
End: 2020-10-02

## 2020-10-02 DIAGNOSIS — L68.0 HIRSUTISM: ICD-10-CM

## 2020-10-02 DIAGNOSIS — L70.0 ACNE VULGARIS: ICD-10-CM

## 2020-10-02 RX ORDER — SPIRONOLACTONE 50 MG/1
50 TABLET, FILM COATED ORAL 2 TIMES DAILY
Qty: 1 TAB | Refills: 0
Start: 2020-10-02 | End: 2020-10-30 | Stop reason: DRUGHIGH

## 2020-10-02 NOTE — PROGRESS NOTES
Results noted. See phone note dated today for full documentation of phone call with patient. Labs unremarkable for workup for concern for PCOS with hirsutism and irregular periods and weight gain. Pt now losing weight and symptoms improved and feeling much better on Spironolactone. Still no clear reason why RPP positive and T pallidum negative, no signs/symptoms of STI or concern for STI. Will plan to recheck TSH in 6 months and can consider RPR check again in 6 months.      Donnie Buchanan MD  PGY3 Family Medicine Resident

## 2020-10-02 NOTE — TELEPHONE ENCOUNTER
Called patient about lab results. Unremarkable except for TSH 3.84. Still no clear cause for positive RPR with negative T pallidum. Will plan to recheck TSH in 6 months. Patient declined ultrasound at this time. Says she start period today on time, not late. Is taking 2 tabs twice a day of spironolactone 25mg, feels a lot better. Less hair, less acne. Has been losing weight. No complaints and all questions answered. Patient then informed me she tested positive for COVID 3 days ago, had close contact with her father who is in the hospital for Anastasiia, but is getting better at this time. Patient has been completely asymptomatic, denies any fever, chills, fatigue, cough, SOB, sore throat, loss of taste or smell, myalgias, N/V, or dirrhea. Had unrelated labs drawn on 9/25/20 at [facility].       Reece Francisco MD

## 2020-12-09 NOTE — PROGRESS NOTES
Genesis from [place] [facility]-Pt on table for CT right now and just told them she might be pregnant and they cannot get an IV in her. Transferred call to [address] [place] [address]. I reviewed with the resident the medical history and the resident's findings on the physical examination. I discussed with the resident the patient's diagnosis and concur with the plan.

## 2021-10-05 NOTE — PROGRESS NOTES
Current pregnancy history:    Yoselin Marquez is a 22 y.o. female who presents for the evaluation of pregnancy. No LMP recorded (lmp unknown). Patient is pregnant. LMP history:  The date of her LMP is not certain. Her last menstrual period was normal and lasted for 4 to 5 days. A urine pregnancy test was positive 1 month ago. She was not on the pill at conception. Pt conceived through IVF    Her menstrual cycles are regular and occur approximately every 28 days  and range from 3 to 5 days. The last menses lasted 4 the usual number of days. Ultrasound data:  She had an  ultrasound done by the ultrasound tech today which revealed a viable estrada pregnancy with a gestational age of 10 weeks and 1 days giving an [facility] of 5/10/2022. Pregnancy symptoms:    Since her LMP she has experienced  urinary frequency, breast tenderness, and nausea. She has not been vomiting over the last few weeks. Associated signs and symptoms which she denies: dysuria, discharge, vaginal bleeding. She states she has not gained weight:     Relevant past pregnancy history:   She has the following pregnancy history:First pregnancy    She has no history of  delivery. Relevant past medical history:(relevant to this pregnancy): noncontributory. Pap/Occupational history:  Last pap smear: 2019 normal    Her occupation is: caretaker of . Substance history: negative for alcohol, tobacco and street drugs. Positive for nothing. Exposure history: There are no indoor cat/s in the home. She denies close contact with children on a regular basis. She has had chicken pox or the vaccine in the past.   Patient denies issues with domestic violence. Genetic Screening/Teratology Counseling: (Includes patient, baby's father, or anyone in either family with:)  3.  Patient's age >/= 28 at [facility]?-- no  .   2.   Thalassemia ([place], [place], [address], or  background): MCV<80?--no.     3.  Neural tube defect (meningomyelocele, spina bifida, anencephaly)?--no.   4.  Congenital heart defect?--no.  5.  Down syndrome?--no.   6.  [place]-Sachs (Hinduism, [address])?--no.   7.  Canavan's Disease?--no.   8.  Familial Dysautonomia?--no.   9.  Sickle cell disease or trait ()? --no   The patient has not been tested for sickle trait  10. Hemophilia or other blood disorders?--no. 11.  Muscular dystrophy?--no. 12.  Cystic fibrosis?--no. 13.  [place]'s Chorea?--no. 14.  Mental retardation/autism (if yes was person tested for Fragile X)?--no. 15.  Other inherited genetic or chromosomal disorder?--no. 12.  Maternal metabolic disorder (DM, PKU, etc)?--no. 17.  Patient or FOB with a child with a birth defect not listed above?--no.  17a. Patient or FOB with a birth defect themselves?--no. 18.  Recurrent pregnancy loss, or stillbirth?--no. 19.  Any medications since LMP other than prenatal vitamins (include vitamins,  supplements, OTC meds, drugs, alcohol)?--no. 20.  Any other genetic/environmental exposure to discuss?--no. Infection History:  1. Lives with someone with TB or TB exposed?--no.   2.  Patient or partner has history of genital herpes?--no.  3.  Rash or viral illness since LMP?--no.    4.  History of STD (GC, CT, HPV, syphilis, HIV)? --no   5. Other: OTHER?      TV ULTRASOUND PERFORMED  A SINGLE VIABLE 9W1D IUP IS SEEN WITH NORMAL CARDIAC RHYTHM. GESTATIONAL AGE BASED ON TODAYS ULTRASOUND. A NORMAL YOLK SAC IS SEEN. RIGHT OVARY APPEARS WITHIN NORMAL LIMITS. LEFT OVARY APPEARS WITHIN NORMAL LIMITS. NO FREE FLUID SEEN IN THE CDS. OB History    Para Term  AB Living   1             SAB TAB Ectopic Molar Multiple Live Births                    # Outcome Date GA Lbr Krish/2nd Weight Sex Delivery Anes PTL Lv   1 Current                No past medical history on file.   Past Surgical History:   Procedure Laterality Date    HX WISDOM TEETH EXTRACTION  2011     Social History Occupational History    Not on file   Tobacco Use    Smoking status: Never Smoker    Smokeless tobacco: Never Used   Substance and Sexual Activity    Alcohol use: No    Drug use: No    Sexual activity: Yes     Partners: Male     Family History   Problem Relation Age of Onset    Diabetes Mother     Heart Disease Maternal Aunt        Allergies   Allergen Reactions    Motrin [Ibuprofen] Hives    Nsaids (Non-Steroidal Anti-Inflammatory Drug) Hives     Prior to Admission medications    Medication Sig Start Date End Date Taking? Authorizing Provider   spironolactone (ALDACTONE) 50 mg tablet Take 1 tablet by mouth twice daily 3/12/21   Rudolph Vaughan MD   sulfacetamide sodium-sulfur 10-5 % topical foam Apply  to affected area two (2) times a day. 9/14/20   Rudolph Vaughan MD   cholecalciferol (VITAMIN D3) (2,000 UNITS /50 MCG) cap capsule Take 2,000 Units by mouth daily.  9/14/20   Rudolph Vaughan MD        Review of Systems: History obtained from the patient  Constitutional: negative for weight loss, fever, night sweats  HEENT: negative for hearing loss, earache, congestion, snoring, sorethroat  CV: negative for chest pain, palpitations, edema  Resp: negative for cough, shortness of breath, wheezing  Breast: negative for breast lumps, nipple discharge, galactorrhea  GI: negative for change in bowel habits, abdominal pain, black or bloody stools  : negative for frequency, dysuria, hematuria, vaginal discharge  MSK: negative for back pain, joint pain, muscle pain  Skin: negative for itching, rash, hives  Neuro: negative for dizziness, headache, confusion, weakness  Psych: negative for anxiety, depression, change in mood  Heme/lymph: negative for bleeding, bruising, pallor    Objective:  Visit Vitals  /80   Wt 239 lb (108.4 kg)   LMP  (LMP Unknown)   BMI 39.77 kg/m²       Physical Exam:   PHYSICAL EXAMINATION    Constitutional  · Appearance: well-nourished, well developed, alert, in no acute distress    HENT  · Head  · Face: appears normal  · Eyes: appear normal  · Ears: normal  · Mouth: normal  · Lips: no lesions    Neck  · Inspection/Palpation: normal appearance, no masses or tenderness  · Lymph Nodes: no lymphadenopathy present  · Thyroid: gland size normal, nontender, no nodules or masses present on palpation    Chest  · Respiratory Effort: breathing unlabored  · Auscultation: normal breath sounds    Cardiovascular  · Heart:  · Auscultation: regular rate and rhythm without murmur    Breasts  · Inspection of Breasts: breasts symmetrical, no skin changes, no discharge present, nipple appearance normal, no skin retraction present  · Palpation of Breasts and Axillae: no masses present on palpation, no breast tenderness  · Axillary Lymph Nodes: no lymphadenopathy present    Gastrointestinal  · Abdominal Examination: abdomen non-tender to palpation, normal bowel sounds, no masses present  · Liver and spleen: no hepatomegaly present, spleen not palpable  · Hernias: no hernias identified    Genitourinary  · External Genitalia: normal appearance for age, no discharge present, no tenderness present, no inflammatory lesions present, no masses present, no atrophy present  · Vagina: normal vaginal vault without central or paravaginal defects, no discharge present, no inflammatory lesions present, no masses present  · Bladder: non-tender to palpation  · Urethra: appears normal  · Cervix: normal   · Uterus: enlarged, normal shape, soft  · Adnexa: no adnexal tenderness present, no adnexal masses present  · Perineum: perineum within normal limits, no evidence of trauma, no rashes or skin lesions present  · Anus: anus within normal limits, no hemorrhoids present  · Inguinal Lymph Nodes: no lymphadenopathy present    Skin  · General Inspection: no rash, no lesions identified    Neurologic/Psychiatric  · Mental Status:  · Orientation: grossly oriented to person, place and time  · Mood and Affect: mood normal, affect appropriate    Assessment:   Intrauterine pregnancy with the following problems identified:  [facility] 2021 - IVF  NIPTS  [facility] - check [facility] records for testing  Flu vaccine 10/6/21  Declines covid vaccine        Plan:     Offered CF testing, CVS, Nuchal Translucency, MSAFP, amnio, and discussed NIPT  Course of pregnancy discussed including visit schedule, routine U/S, glucola testing, etc.  Avoid alcoholic beverages and illicit/recreational drugs use  Take prenatal vitamins or folic acid daily. Hospital and practice style discussed with coverage system. Discussed nutrition, toxoplasmosis precautions, sexual activity, exercise, need for influenza vaccine, environmental and work hazards, travel advice, screen for domestic violence, need for seat belts. Discussed seafood, unpasteurized dairy products, deli meat, artificial sweeteners, and caffeine. Information on prenatal classes/breastfeeding given. Information on circumcision given  Patient encouraged not to smoke. Discussed current prescription drug use. Given medication list.  Discussed the use of over the counter medications and chemicals. Route of delivery discussed, including risks, benefits, and alternatives of  versus repeat LTCS. Pt understands risk of hemorrhage during pregnancy and post delivery and would accept blood products if necessary in life-threatening emergencies      Handouts given to pt.

## 2021-10-06 ENCOUNTER — INITIAL PRENATAL (OUTPATIENT)
Dept: OBGYN CLINIC | Age: 26
End: 2021-10-06

## 2021-10-06 VITALS — SYSTOLIC BLOOD PRESSURE: 123 MMHG | DIASTOLIC BLOOD PRESSURE: 80 MMHG | BODY MASS INDEX: 39.77 KG/M2 | WEIGHT: 239 LBS

## 2021-10-06 DIAGNOSIS — Z34.00 SUPERVISION OF NORMAL FIRST PREGNANCY, ANTEPARTUM: Primary | ICD-10-CM

## 2021-10-06 PROCEDURE — 90471 IMMUNIZATION ADMIN: CPT | Performed by: OBSTETRICS & GYNECOLOGY

## 2021-10-06 PROCEDURE — 90686 IIV4 VACC NO PRSV 0.5 ML IM: CPT | Performed by: OBSTETRICS & GYNECOLOGY

## 2021-10-06 PROCEDURE — 0502F SUBSEQUENT PRENATAL CARE: CPT | Performed by: OBSTETRICS & GYNECOLOGY

## 2021-10-06 PROCEDURE — 99213 OFFICE O/P EST LOW 20 MIN: CPT | Performed by: OBSTETRICS & GYNECOLOGY

## 2021-10-06 NOTE — PATIENT INSTRUCTIONS
Weeks 6 to 10 of Your Pregnancy: Care Instructions  Overview     During the first 6 to 10 weeks of your pregnancy, your body goes through many changes. Your baby grows very quickly, even though you can't feel it yet. You may start to feel different, both in your body and your emotions. Because each pregnancy is unique, there's no right way to feel. You may feel the healthiest you've ever been, or you might feel tired or sick to your stomach (\"morning sickness\"). These early weeks are a time to make healthy choices and to eat the best foods for you and your baby. This is also a good time to think about birth defects testing. These are tests done during pregnancy to look for possible problems with the baby. First-trimester tests for birth defects can be done between 8 and 13 weeks of pregnancy, depending on the test. Talk with your doctor about what kinds of tests are available. Follow-up care is a key part of your treatment and safety. Be sure to make and go to all appointments, and call your doctor if you are having problems. It's also a good idea to know your test results and keep a list of the medicines you take. How can you care for yourself at home? Eat well  · Eat at least 3 meals and 2 healthy snacks every day. Eat fresh, whole foods, including:  ? 7 or more servings of bread, tortillas, cereal, rice, pasta, or oatmeal.  ? 3 or more servings of vegetables, especially leafy green vegetables. ? 2 or more servings of fruits. ? 3 or more servings of milk, yogurt, or cheese. ? 2 or more servings of meat, turkey, chicken, fish, eggs, or dried beans. · Drink plenty of fluids, especially water. Avoid sodas and other sweetened drinks. · Choose foods that have important vitamins for your baby, such as calcium, iron, and folate. ? Dairy products, tofu, canned fish with bones, almonds, broccoli, dark leafy greens, corn tortillas, and fortified orange juice are good sources of calcium. ?  Beef, poultry, liver, spinach, lentils, dried beans, fortified cereals, and dried fruits are rich in iron. ? Dark leafy greens, broccoli, asparagus, liver, fortified cereals, orange juice, peanuts, and almonds are good sources of folate. · Avoid foods that could harm your baby. ? Do not eat raw or undercooked meat, chicken, or fish (such as sushi or raw oysters). ? Do not eat raw eggs or foods that contain raw eggs, such as Caesar dressing. ? Do not eat soft cheeses and unpasteurized dairy foods, such as Brie, feta, or blue cheese. ? Do not eat fish that contains a lot of mercury, such as shark, swordfish, tilefish, or edita mackerel. Limit some other types of fish, such as white (albacore) tuna, to 4 oz (0.1 kg) a week. ? Do not eat raw sprouts, especially alfalfa sprouts. ? Cut down on caffeine, such as coffee, tea, and cola. Protect yourself and your baby  · Do not touch quincy litter or cat feces. They can cause an infection that could harm your baby. · High body temperature can be harmful to your baby. So if you want to use a sauna or hot tub, be sure to talk to your doctor about how to use it safely. [place] with morning sickness  · Sip small amounts of water, juices, or shakes. Try drinking between meals, not with meals. · Eat 5 or 6 small meals a day. Try dry toast or crackers when you first get up, and eat breakfast a little later. · Avoid spicy, greasy, and fatty foods. · When you feel sick, open your windows or go for a short walk to get fresh air. · Try nausea wristbands. These help some people. · Tell your doctor if you think your prenatal vitamins make you sick. Where can you learn more? Go to http://www.gray.com/  Enter G112 in the search box to learn more about \"Weeks 6 to 10 of Your Pregnancy: Care Instructions. \"  Current as of: June 16, 2021               Content Version: 13.0  © 9488-7743 Healthwise, Incorporated.    Care instructions adapted under license by [employer] (which disclaims liability or warranty for this information). If you have questions about a medical condition or this instruction, always ask your healthcare professional. [address] any warranty or liability for your use of this information.

## 2021-10-07 ENCOUNTER — TELEPHONE (OUTPATIENT)
Dept: OBGYN CLINIC | Age: 26
End: 2021-10-07

## 2021-10-07 DIAGNOSIS — Z3A.09 9 WEEKS GESTATION OF PREGNANCY: Primary | ICD-10-CM

## 2021-10-07 LAB
ABO + RH BLD: NORMAL
ANTI-COMPLEMENT (C3B,C3D): NORMAL
BACTERIA SPEC CULT: NORMAL
BLOOD BANK CMNT PATIENT-IMP: NORMAL
BLOOD GROUP ANTIBODIES SERPL: NORMAL
DAT IGG-SP REAG RBC QL: NORMAL
DAT POLY-SP REAG RBC QL: NORMAL
ERYTHROCYTE [DISTWIDTH] IN BLOOD BY AUTOMATED COUNT: 12.7 % (ref 11.5–14.5)
HBV SURFACE AG SER QL: <0.1 INDEX
HBV SURFACE AG SER QL: NEGATIVE
HCT VFR BLD AUTO: 40.9 % (ref 35–47)
HCV AB SERPL QL IA: NONREACTIVE
HGB BLD-MCNC: 13.1 G/DL (ref 11.5–16)
HIV 1+2 AB+HIV1 P24 AG SERPL QL IA: NONREACTIVE
HIV12 RESULT COMMENT, HHIVC: NORMAL
MCH RBC QN AUTO: 28.5 PG (ref 26–34)
MCHC RBC AUTO-ENTMCNC: 32 G/DL (ref 30–36.5)
MCV RBC AUTO: 89.1 FL (ref 80–99)
NRBC # BLD: 0 K/UL (ref 0–0.01)
NRBC BLD-RTO: 0 PER 100 WBC
PLATELET # BLD AUTO: 314 K/UL (ref 150–400)
PMV BLD AUTO: 11 FL (ref 8.9–12.9)
RBC # BLD AUTO: 4.59 M/UL (ref 3.8–5.2)
RUBV IGG SER-IMP: REACTIVE
RUBV IGG SERPL IA-ACNC: 250.2 IU/ML
SERVICE CMNT-IMP: NORMAL
SPECIMEN EXP DATE BLD: NORMAL
WBC # BLD AUTO: 9.3 K/UL (ref 3.6–11)

## 2021-10-07 NOTE — TELEPHONE ENCOUNTER
[facility] calling to state that this pt is antibody positive and needs an extra specimen for reference testing. They also needed to know if she has a hx of a transfusion?       Please advise

## 2021-10-07 NOTE — TELEPHONE ENCOUNTER
I have no idea! We can send another sample at her next visit.  Ask them what test we need to order and put it on her prenatal problem list  Thanks

## 2021-10-07 NOTE — TELEPHONE ENCOUNTER
This RN ordered an Antibody screen for pt's next visit on 10/27. Lab states they will need 4-5 pink tops drawn for this order.      SAMII

## 2021-10-08 PROBLEM — Z34.00 SUPERVISION OF NORMAL FIRST PREGNANCY, ANTEPARTUM: Status: ACTIVE | Noted: 2021-10-08

## 2021-10-08 LAB — TREPONEMA PALLIDUM IGG+IGM AB [PRESENCE] IN SERUM OR PLASMA BY IMMUNOASSAY: NON REACTIVE

## 2021-10-09 LAB
C TRACH RRNA SPEC QL NAA+PROBE: NEGATIVE
N GONORRHOEA RRNA SPEC QL NAA+PROBE: NEGATIVE
T VAGINALIS DNA SPEC QL NAA+PROBE: NEGATIVE

## 2021-10-27 ENCOUNTER — ROUTINE PRENATAL (OUTPATIENT)
Dept: OBGYN CLINIC | Age: 26
End: 2021-10-27
Payer: OTHER GOVERNMENT

## 2021-10-27 VITALS — SYSTOLIC BLOOD PRESSURE: 116 MMHG | DIASTOLIC BLOOD PRESSURE: 77 MMHG | WEIGHT: 242.4 LBS | BODY MASS INDEX: 40.34 KG/M2

## 2021-10-27 DIAGNOSIS — Z34.00 SUPERVISION OF NORMAL FIRST PREGNANCY, ANTEPARTUM: Primary | ICD-10-CM

## 2021-10-27 PROCEDURE — 0502F SUBSEQUENT PRENATAL CARE: CPT | Performed by: OBSTETRICS & GYNECOLOGY

## 2021-10-27 PROCEDURE — 99212 OFFICE O/P EST SF 10 MIN: CPT | Performed by: OBSTETRICS & GYNECOLOGY

## 2021-10-27 NOTE — PROGRESS NOTES
Doing well  NIPTS/[facility] today  Abnormal antibody detected - no hx of transfusion, this is her first pregnancy - will send 5 tubes as requested by lab

## 2021-10-27 NOTE — PROGRESS NOTES
Problem List  Date Reviewed: 10/6/2021        Codes Class Noted    Supervision of normal first pregnancy, antepartum ICD-10-CM: Z34.00  ICD-9-CM: V22.0  10/8/2021    Overview Addendum 10/8/2021  7:55 AM by Tonja Valles     Intrauterine pregnancy with the following problems identified:  [facility] 5/11/2021 - IVF  NIPTS  [facility] - check [facility] records for testing  Flu vaccine 10/6/21  Declines covid vaccine  Abnormal antibody screen - retest with 5 tubes next visit

## 2021-10-29 LAB
ANTI-COMPLEMENT (C3B,C3D): NORMAL
ANTIGENS PRESENT BLD: NORMAL
BLD GP AB SCN SERPL QL ELUTION: NORMAL
BLOOD GROUP ANTIBODIES SERPL: NORMAL
BLOOD GROUP ANTIBODIES SERPL: NORMAL
DAT IGG-SP REAG RBC QL: NORMAL
DAT POLY-SP REAG RBC QL: NORMAL

## 2021-11-01 NOTE — PROGRESS NOTES
Call the lab and find out exactly what this means. Which antibody is positive? All of these? E, c and Janett? ?  This is a big deal so they need to give us an answer

## 2021-11-03 NOTE — PROGRESS NOTES
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

## 2021-11-24 ENCOUNTER — ROUTINE PRENATAL (OUTPATIENT)
Dept: OBGYN CLINIC | Age: 26
End: 2021-11-24
Payer: OTHER GOVERNMENT

## 2021-11-24 VITALS
WEIGHT: 243 LBS | SYSTOLIC BLOOD PRESSURE: 118 MMHG | BODY MASS INDEX: 40.48 KG/M2 | HEIGHT: 65 IN | DIASTOLIC BLOOD PRESSURE: 74 MMHG

## 2021-11-24 DIAGNOSIS — O99.212 OBESITY AFFECTING PREGNANCY IN SECOND TRIMESTER: ICD-10-CM

## 2021-11-24 DIAGNOSIS — Z3A.16 16 WEEKS GESTATION OF PREGNANCY: ICD-10-CM

## 2021-11-24 DIAGNOSIS — Z34.00 SUPERVISION OF NORMAL FIRST PREGNANCY, ANTEPARTUM: Primary | ICD-10-CM

## 2021-11-24 PROCEDURE — 0502F SUBSEQUENT PRENATAL CARE: CPT | Performed by: OBSTETRICS & GYNECOLOGY

## 2021-11-24 PROCEDURE — 99212 OFFICE O/P EST SF 10 MIN: CPT | Performed by: OBSTETRICS & GYNECOLOGY

## 2021-11-24 NOTE — PROGRESS NOTES
Problem List  Date Reviewed: 10/27/2021          Codes Class Noted    Supervision of normal first pregnancy, antepartum ICD-10-CM: Z34.00  ICD-9-CM: V22.0  10/8/2021    Overview Addendum 11/18/2021  7:24 AM by Dale Ontiveros     Intrauterine pregnancy with the following problems identified:  [facility] 5/11/2021 - IVF  NIPTS-NIPTS low fetal fraction - repeat at next visit  [facility] - neg  Flu vaccine 10/6/21  Declines covid vaccine  AB screen pos for autoantibodies - need 5 pink tops for AB screen at 39 weeks and draw on admission to L&D  - NEEDS 5 PINK TOPS FOR AB SCREEN

## 2021-11-26 LAB
AFP ADJ MOM SERPL: 1.32
AFP INTERP SERPL-IMP: NORMAL
AFP INTERP SERPL-IMP: NORMAL
AFP SERPL-MCNC: 33.4 NG/ML
AGE AT DELIVERY: 26.5 YR
COMMENT, 018013: NORMAL
GA METHOD: NORMAL
GA: 16 WEEKS
IDDM PATIENT QL: NO
MULTIPLE PREGNANCY: NORMAL
NEURAL TUBE DEFECT RISK FETUS: 4529 %
RESULTS, 017004: NORMAL

## 2021-12-03 ENCOUNTER — PATIENT MESSAGE (OUTPATIENT)
Dept: OBGYN CLINIC | Age: 26
End: 2021-12-03

## 2022-03-18 PROBLEM — Z34.00 SUPERVISION OF NORMAL FIRST PREGNANCY, ANTEPARTUM: Status: ACTIVE | Noted: 2021-10-08

## 2022-03-29 ENCOUNTER — OFFICE VISIT (OUTPATIENT)
Dept: FAMILY MEDICINE CLINIC | Age: 27
End: 2022-03-29
Payer: OTHER GOVERNMENT

## 2022-03-29 VITALS
DIASTOLIC BLOOD PRESSURE: 73 MMHG | HEIGHT: 65 IN | BODY MASS INDEX: 43.82 KG/M2 | RESPIRATION RATE: 17 BRPM | SYSTOLIC BLOOD PRESSURE: 111 MMHG | HEART RATE: 103 BPM | TEMPERATURE: 97.5 F | OXYGEN SATURATION: 97 % | WEIGHT: 263 LBS

## 2022-03-29 DIAGNOSIS — R00.0 TACHYCARDIA: Primary | ICD-10-CM

## 2022-03-29 DIAGNOSIS — Z3A.34 34 WEEKS GESTATION OF PREGNANCY: ICD-10-CM

## 2022-03-29 PROCEDURE — 99213 OFFICE O/P EST LOW 20 MIN: CPT | Performed by: FAMILY MEDICINE

## 2022-03-29 RX ORDER — OMEPRAZOLE 40 MG/1
40 CAPSULE, DELAYED RELEASE ORAL DAILY
COMMUNITY

## 2022-03-29 NOTE — PROGRESS NOTES
Chichi Klein  32 y.o. female  1995  [id number]  [facility]  Progress Note     Encounter Date: 3/29/2022    Assessment and Plan:     Encounter Diagnoses     ICD-10-CM ICD-9-CM   1. Tachycardia  R00.0 785.0   2. 34 weeks gestation of pregnancy  Z3A.34 V22.2       1. Tachycardia  Noted on VS. Per patient she has episodes of tachycardia >100 quite often. No formal work up done before. Patient did not have a meal or any PO fluids today. After drinking 2 cups of water HR down to 103. Patient will RTC in 2 weeks for follow up, would proceed with basic work up: CBC, TSH, CMP and EKG if remains >100. She will discuss this with [facility] provider as well. 2. 34 weeks gestation of pregnancy  Per insurance requirement referral placed for currently supervised pregnancy.   - REFERRAL TO OBSTETRICS AND GYNECOLOGY      I have discussed the diagnosis with the patient and the intended plan as seen in the above orders. she has expressed understanding. The patient has received an after-visit summary and questions were answered concerning future plans. I have discussed medication side effects and warnings with the patient as well. Electronically Signed: Ivy Arguello MD    Current Medications after this visit     Current Outpatient Medications   Medication Sig    omeprazole (PRILOSEC) 40 mg capsule Take 40 mg by mouth daily.  prenatal vit-iron fumarate-fa 27 mg iron- 0.8 mg tab tablet Take 1 Tablet by mouth daily. No current facility-administered medications for this visit. There are no discontinued medications. ~~~~~~~~~~~~~~~~~~~~~~~~~~~~~~~~~~~~~~~~~~~~~~~~~~~~~~~~~~~    Chief Complaint   Patient presents with   Vicky Lantigua Referral Request     pt is pregnant (not our OB pt). History provided by patient  History of Present Illness   Irene Chisholm is a 32 y.o. female who presents to clinic today for:  Referral Request (pt is pregnant (not our OB pt). )      Presents for [employer] referral   Follows with Dr Chinyere Riley by [address]  Her insurance requires the referral   She does not have any issues       Health Maintenance    Health Maintenance Due   Topic Date Due    COVID-19 Vaccine (1) Never done    HPV Age 9Y-34Y (1 - 2-dose series) Never done    OB 3RD TRIMESTER TDAP  2022     Review of Systems   Review of Systems   Constitutional: Negative for malaise/fatigue and weight loss. Respiratory: Negative for cough and shortness of breath. Cardiovascular: Negative for palpitations. Gastrointestinal: Negative for nausea and vomiting. Neurological: Negative for headaches. Psychiatric/Behavioral: The patient does not have insomnia. Vitals/Objective:     Vitals:    22 1549 22 1616   BP: 111/73    Pulse: (!) 120 (!) 103   Resp: 17    Temp: 97.5 °F (36.4 °C)    TempSrc: Temporal    SpO2: 97%    Weight: 263 lb (119.3 kg)    Height: 5' 5\" (1.651 m)      Body mass index is 43.77 kg/m². Wt Readings from Last 3 Encounters:   22 263 lb (119.3 kg)   21 243 lb (110.2 kg)   10/27/21 242 lb 6.4 oz (110 kg)       Objective  Physical Exam  General: Patient alert and oriented and in NAD  Neck: No thyromegaly or cervical lymphadenopathy  Cardiovascular: Heart has regular rate and rhythm, No murmurs, rubs or gallops. No edema  Respiratory: Lungs are clear to auscultation bilaterally, no wheezing, rales or rhonchi, normal chest excursion and no increased work of breathing. Gastorintestinal: abdomen is non-tender, gravid  Musculoskeletal: All four extremities present and functional.   Skin: No rashes or lesions noted on exposed skin  Neuro: AAOx3, normal gait and speech. No gross neurologic deficits. Psych: Appropriate mood and affect    No results found for this or any previous visit (from the past 24 hour(s)).    Disposition     Future Appointments   Date Time Provider Polly Sherman   2022  3:30 PM MD JUANA CastilloFP BS JOSE ARMANDO   2022 10:30 AM Hardeep Bradford MD [facility]       History   Patient's past medical, surgical and family histories were reviewed and updated. History reviewed. No pertinent past medical history.   Past Surgical History:   Procedure Laterality Date    HX WISDOM TEETH EXTRACTION  12/2011     Family History   Problem Relation Age of Onset    Diabetes Mother     Heart Disease Maternal Aunt      Social History     Tobacco Use    Smoking status: Never Smoker    Smokeless tobacco: Never Used   Substance Use Topics    Alcohol use: No    Drug use: No       Allergies     Allergies   Allergen Reactions    Motrin [Ibuprofen] Hives    Nsaids (Non-Steroidal Anti-Inflammatory Drug) Hives

## 2022-03-29 NOTE — PROGRESS NOTES
Chief Complaint   Patient presents with   Malini Spurling Referral Request     pt is pregnant (not our OB pt). 1. Have you been to the ER, urgent care clinic since your last visit? Hospitalized since your last visit? No    2. Have you seen or consulted any other health care providers outside of the [address] [place] [address] since your last visit? Include any pap smears or colon screening.  No

## 2022-04-08 ENCOUNTER — OFFICE VISIT (OUTPATIENT)
Dept: FAMILY MEDICINE CLINIC | Age: 27
End: 2022-04-08
Payer: OTHER GOVERNMENT

## 2022-04-08 VITALS
HEART RATE: 111 BPM | RESPIRATION RATE: 16 BRPM | DIASTOLIC BLOOD PRESSURE: 69 MMHG | HEIGHT: 65 IN | BODY MASS INDEX: 44.32 KG/M2 | SYSTOLIC BLOOD PRESSURE: 104 MMHG | WEIGHT: 266 LBS | OXYGEN SATURATION: 97 %

## 2022-04-08 DIAGNOSIS — Z3A.35 35 WEEKS GESTATION OF PREGNANCY: ICD-10-CM

## 2022-04-08 DIAGNOSIS — R00.0 TACHYCARDIA: Primary | ICD-10-CM

## 2022-04-08 LAB
BILIRUB UR QL STRIP: NEGATIVE
GLUCOSE UR-MCNC: NEGATIVE MG/DL
KETONES P FAST UR STRIP-MCNC: NEGATIVE MG/DL
PH UR STRIP: 6 [PH] (ref 4.6–8)
PROT UR QL STRIP: NORMAL
SP GR UR STRIP: 1.03 (ref 1–1.03)
UA UROBILINOGEN AMB POC: NORMAL (ref 0.2–1)
URINALYSIS CLARITY POC: CLEAR
URINALYSIS COLOR POC: NORMAL
URINE BLOOD POC: NORMAL
URINE LEUKOCYTES POC: NORMAL
URINE NITRITES POC: NEGATIVE

## 2022-04-08 PROCEDURE — 81003 URINALYSIS AUTO W/O SCOPE: CPT | Performed by: FAMILY MEDICINE

## 2022-04-08 PROCEDURE — 99214 OFFICE O/P EST MOD 30 MIN: CPT | Performed by: FAMILY MEDICINE

## 2022-04-08 PROCEDURE — 93000 ELECTROCARDIOGRAM COMPLETE: CPT | Performed by: FAMILY MEDICINE

## 2022-04-08 NOTE — PROGRESS NOTES
Celi Klein  32 y.o. female  1995  [id number]  [address] [place] [address] CTR  Progress Note     Encounter Date: 4/8/2022    Assessment and Plan:     Encounter Diagnoses     ICD-10-CM ICD-9-CM   1. Tachycardia  R00.0 785.0   2. 34 weeks gestation of pregnancy  Z3A.34 V22.2       1. Tachycardia  Most likely 2/2 pregnancy and obesity, however, given no prev work up checked EKG that shows sinus tach and getting labs. Will inform patient about lab results and fax them to OBGYN. Advised to stay hydrated and do not skip meals. RTC if becomes symptomatic, ER precautions given. I spoke to Dr Nona Brand with [facility] group that follows patient for OB care: she agreed to the plan and said there was no concern for PreE during this pregnancy. - AMB POC URINALYSIS DIP STICK AUTO W/O MICRO  - METABOLIC PANEL, COMPREHENSIVE; Future  - CBC W/O DIFF; Future  - TSH 3RD GENERATION; Future  - T4, FREE; Future  - T3 TOTAL; Future  - AMB POC EKG ROUTINE W/ 12 LEADS, INTER & REP  - UCx collected     2. 34 weeks gestation of pregnancy  Follows with OBGYN. Got labs checked, has hx of warm autoantibodies, but I don't think it contributes to tachycardia. Notes and results will be routed to [facility] provider. I have discussed the diagnosis with the patient and the intended plan as seen in the above orders. she has expressed understanding. The patient has received an after-visit summary and questions were answered concerning future plans. I have discussed medication side effects and warnings with the patient as well. Electronically Signed: Aneesh Cedillo MD    Current Medications after this visit     Current Outpatient Medications   Medication Sig    omeprazole (PRILOSEC) 40 mg capsule Take 40 mg by mouth daily.  prenatal vit-iron fumarate-fa 27 mg iron- 0.8 mg tab tablet Take 1 Tablet by mouth daily. No current facility-administered medications for this visit.      There are no discontinued medications. ~~~~~~~~~~~~~~~~~~~~~~~~~~~~~~~~~~~~~~~~~~~~~~~~~~~~~~~~~~~    Chief Complaint   Patient presents with    Rapid Heart Rate     History provided by patient  History of Present Illness   Sravanthi Jones is a 32 y.o. female who presents to clinic today for:  Rapid Heart Rate    Tachycardia:   Patient denies any smx  She reports he eats well and drinks water  She sees OB regularly and no issues were communicated to her thus far  She got some labs yesterday, but she does not recall which ones      Health Maintenance    Health Maintenance Due   Topic Date Due    COVID-19 Vaccine (1) Never done    HPV Age 9Y-34Y (1 - 2-dose series) Never done    OB 3RD TRIMESTER TDAP  02/08/2022     Review of Systems   ROS   General/Constitutional:   No headache, fever, fatigue, weight loss or weight gain       Neck:   No swelling, masses, stiffness, pain, or limited movement     Cardiac:    No chest pain or palpitations     Respiratory:   No cough or shortness of breath     GI:   No nausea/vomiting, diarrhea, abdominal pain, bloody or dark stools       :   No dysuria or  hematuria    Neurological:   No loss of consciousness, dizziness, seizures, dysarthria, cognitive changes, memory changes,  problems with balance, or unilateral weakness     Skin: No rash      Vitals/Objective:     Vitals:    04/08/22 1540   BP: 104/69   Pulse: (!) 111   Resp: 16   SpO2: 97%   Weight: 266 lb (120.7 kg)   Height: 5' 5\" (1.651 m)     Body mass index is 44.26 kg/m². Wt Readings from Last 3 Encounters:   04/08/22 266 lb (120.7 kg)   03/29/22 263 lb (119.3 kg)   11/24/21 243 lb (110.2 kg)         Objective  Physical Exam  General: Patient alert and oriented and in NAD  Neck: No thyromegaly or cervical lymphadenopathy  Cardiovascular: Heart has regular rate and tachycardia noted, No murmurs, rubs or gallops.  No edema  Respiratory: Lungs are clear to auscultation bilaterally, no wheezing, rales or rhonchi, normal chest excursion and no increased work of breathing. Gastorintestinal: abdomen is gravid  Musculoskeletal: All four extremities present and functional.   Skin: No rashes or lesions noted on exposed skin  Neuro: AAOx3, normal gait and speech. No gross neurologic deficits. Psych: Appropriate mood and affect    No results found for this or any previous visit (from the past 24 hour(s)). Disposition     No future appointments. History   Patient's past medical, surgical and family histories were reviewed and updated. History reviewed. No pertinent past medical history.   Past Surgical History:   Procedure Laterality Date    HX WISDOM TEETH EXTRACTION  12/2011     Family History   Problem Relation Age of Onset    Diabetes Mother     Heart Disease Maternal Aunt      Social History     Tobacco Use    Smoking status: Never Smoker    Smokeless tobacco: Never Used   Substance Use Topics    Alcohol use: No    Drug use: No       Allergies     Allergies   Allergen Reactions    Motrin [Ibuprofen] Hives    Nsaids (Non-Steroidal Anti-Inflammatory Drug) Hives

## 2022-04-08 NOTE — PROGRESS NOTES
Agata Resendiz is a 32 y.o. female    Chief Complaint   Patient presents with    Rapid Heart Rate       1. Have you been to the ER, urgent care clinic since your last visit? Hospitalized since your last visit? No  2. Have you seen or consulted any other health care providers outside of the [address] [place] [address] since your last visit? Include any pap smears or colon screening.  No    Visit Vitals  /69 (BP 1 Location: Right arm, BP Patient Position: Sitting)   Pulse (!) 111   Resp 16   Ht 5' 5\" (1.651 m)   Wt 266 lb (120.7 kg)   SpO2 97%   BMI 44.26 kg/m²       Health Maintenance Due   Topic Date Due    COVID-19 Vaccine (1) Never done    HPV Age 9Y-34Y (1 - 2-dose series) Never done    OB 3RD TRIMESTER TDAP  02/08/2022

## 2022-04-09 LAB
ALBUMIN SERPL-MCNC: 2.7 G/DL (ref 3.5–5)
ALBUMIN/GLOB SERPL: 0.7 {RATIO} (ref 1.1–2.2)
ALP SERPL-CCNC: 333 U/L (ref 45–117)
ALT SERPL-CCNC: 82 U/L (ref 12–78)
ANION GAP SERPL CALC-SCNC: 7 MMOL/L (ref 5–15)
AST SERPL-CCNC: 54 U/L (ref 15–37)
BACTERIA SPEC CULT: NORMAL
BILIRUB SERPL-MCNC: 0.4 MG/DL (ref 0.2–1)
BUN SERPL-MCNC: 7 MG/DL (ref 6–20)
BUN/CREAT SERPL: 12 (ref 12–20)
CALCIUM SERPL-MCNC: 9.3 MG/DL (ref 8.5–10.1)
CHLORIDE SERPL-SCNC: 104 MMOL/L (ref 97–108)
CO2 SERPL-SCNC: 25 MMOL/L (ref 21–32)
CREAT SERPL-MCNC: 0.59 MG/DL (ref 0.55–1.02)
ERYTHROCYTE [DISTWIDTH] IN BLOOD BY AUTOMATED COUNT: 14.7 % (ref 11.5–14.5)
GLOBULIN SER CALC-MCNC: 4.1 G/DL (ref 2–4)
GLUCOSE SERPL-MCNC: 114 MG/DL (ref 65–100)
HCT VFR BLD AUTO: 39.4 % (ref 35–47)
HGB BLD-MCNC: 12.6 G/DL (ref 11.5–16)
MCH RBC QN AUTO: 27.3 PG (ref 26–34)
MCHC RBC AUTO-ENTMCNC: 32 G/DL (ref 30–36.5)
MCV RBC AUTO: 85.5 FL (ref 80–99)
NRBC # BLD: 0 K/UL (ref 0–0.01)
NRBC BLD-RTO: 0 PER 100 WBC
PLATELET # BLD AUTO: 290 K/UL (ref 150–400)
PMV BLD AUTO: 11.2 FL (ref 8.9–12.9)
POTASSIUM SERPL-SCNC: 3.7 MMOL/L (ref 3.5–5.1)
PROT SERPL-MCNC: 6.8 G/DL (ref 6.4–8.2)
RBC # BLD AUTO: 4.61 M/UL (ref 3.8–5.2)
SERVICE CMNT-IMP: NORMAL
SODIUM SERPL-SCNC: 136 MMOL/L (ref 136–145)
T4 FREE SERPL-MCNC: 1 NG/DL (ref 0.8–1.5)
TSH SERPL DL<=0.05 MIU/L-ACNC: 2.21 UIU/ML (ref 0.36–3.74)
WBC # BLD AUTO: 10.9 K/UL (ref 3.6–11)

## 2022-04-11 LAB — T3 SERPL-MCNC: 317 NG/DL (ref 71–180)

## 2022-04-17 NOTE — PROGRESS NOTES
TSH and T4 normal for 3rd trimester, T3 is somewhat elevated, CBC unremarkable with PLT wnl, CMP with elevated LFTs, UCx NG, will discuss with patient and forward results to [facility] provider.

## 2022-04-20 ENCOUNTER — TELEPHONE (OUTPATIENT)
Dept: FAMILY MEDICINE CLINIC | Age: 27
End: 2022-04-20

## 2022-04-20 DIAGNOSIS — R79.89 ABNORMAL THYROID BLOOD TEST: Primary | ICD-10-CM

## 2022-04-21 NOTE — TELEPHONE ENCOUNTER
Spoke to patient and her OB doctor (Dr Melida Friend) about abnormal thyroid labs and elevated LFTs. Discussed all other labs that were normal. Discussed the plan. Referral to endo placed. Patient is unlikely to be able to see endo at this time of pregnancy (she is 45 1/6), but her OB will have a plan for her and the baby given the findings. RTC post partum for repeat labs and follow up.      Talon Aguilera MD

## 2022-06-23 ENCOUNTER — OFFICE VISIT (OUTPATIENT)
Dept: FAMILY MEDICINE CLINIC | Age: 27
End: 2022-06-23
Payer: OTHER GOVERNMENT

## 2022-06-23 VITALS
HEART RATE: 94 BPM | DIASTOLIC BLOOD PRESSURE: 73 MMHG | BODY MASS INDEX: 40.77 KG/M2 | OXYGEN SATURATION: 99 % | WEIGHT: 245 LBS | RESPIRATION RATE: 16 BRPM | SYSTOLIC BLOOD PRESSURE: 110 MMHG

## 2022-06-23 DIAGNOSIS — R00.0 TACHYCARDIA: Primary | ICD-10-CM

## 2022-06-23 PROCEDURE — 99213 OFFICE O/P EST LOW 20 MIN: CPT | Performed by: FAMILY MEDICINE

## 2022-06-23 NOTE — PROGRESS NOTES
Alan Klein  32 y.o. female  1995  [id number]  [facility] CTR  Progress Note     Encounter Date: 6/23/2022    Assessment and Plan:     Encounter Diagnoses     ICD-10-CM ICD-9-CM   1. Tachycardia  R00.0 785.0       1. Tachycardia  Resolved postpartum. Repeating thyroid labs, however, have low suspicion for thyroid disease at this time. Worrisome symptoms discussed. RTC PRN or if labs abnormal. Patient is aware labs will be addressed by Dr Naveen Carroll.    - Rosie Burleson; Future  - T3 TOTAL; Future  - T4, FREE; Future  - T4, FREE  - T3 TOTAL  - TSH 3RD GENERATION      I have discussed the diagnosis with the patient and the intended plan as seen in the above orders. she has expressed understanding. The patient has received an after-visit summary and questions were answered concerning future plans. I have discussed medication side effects and warnings with the patient as well. Electronically Signed: Oli Vega MD    Current Medications after this visit     Current Outpatient Medications   Medication Sig    omeprazole (PRILOSEC) 40 mg capsule Take 40 mg by mouth daily.  prenatal vit-iron fumarate-fa 27 mg iron- 0.8 mg tab tablet Take 1 Tablet by mouth daily. No current facility-administered medications for this visit. There are no discontinued medications. ~~~~~~~~~~~~~~~~~~~~~~~~~~~~~~~~~~~~~~~~~~~~~~~~~~~~~~~~~~~    Chief Complaint   Patient presents with    Follow-up     on tachycardia       History provided by patient  History of Present Illness   Nancy Leyva is a 32 y.o. female who presents to clinic today for:  Follow-up (on tachycardia)      Doing well post partum. Had c/section done   Seen by OB and cleared already. Denies palpitations or chest pain. Baby is doing well. Nursing without issues.      Health Maintenance    Health Maintenance Due   Topic Date Due    COVID-19 Vaccine (1) Never done    HPV Age 9Y-34Y (1 - 2-dose series) Never done    OB 3RD TRIMESTER TDAP  02/08/2022     Review of Systems   ROS   General/Constitutional:   No headache, fever, fatigue, weight loss or weight gain       Eyes:   No redness, pruritis, pain, visual changes, swelling, or discharge      Ears:    No pain, loss or changes in hearing     Neck:   No swelling, masses, stiffness, pain, or limited movement     Cardiac:    No chest pain      Respiratory:   No cough or shortness of breath     GI:   No nausea/vomiting, diarrhea, abdominal pain, bloody or dark stools       :   No dysuria or  hematuria    Neurological:   No loss of consciousness, dizziness, seizures, dysarthria, cognitive changes, memory changes,  problems with balance, or unilateral weakness     Skin: No rash      Vitals/Objective:     Vitals:    06/23/22 1300   BP: 110/73   Pulse: 94   Resp: 16   SpO2: 99%   Weight: 245 lb (111.1 kg)     Body mass index is 40.77 kg/m². Wt Readings from Last 3 Encounters:   06/23/22 245 lb (111.1 kg)   04/08/22 266 lb (120.7 kg)   03/29/22 263 lb (119.3 kg)         Objective  Physical Exam  General: Patient alert and oriented and in NAD  Neck: No thyromegaly or cervical lymphadenopathy  Cardiovascular: Heart has regular rate and rhythm, No edema  Respiratory:  normal chest excursion and no increased work of breathing. Genitourinary: exam deferred  Musculoskeletal: All four extremities present and functional.   Skin: No rashes or lesions noted on exposed skin  Neuro: AAOx3, normal gait and speech. No gross neurologic deficits. Psych: Appropriate mood and affect, no homicidal or suicidal ideation, no obsessions, delusions or hallucinations, normal psychomotor status. No results found for this or any previous visit (from the past 24 hour(s)). Disposition     No future appointments. History   Patient's past medical, surgical and family histories were reviewed and updated. History reviewed. No pertinent past medical history.   Past Surgical History: Procedure Laterality Date    HX WISDOM TEETH EXTRACTION  12/2011     Family History   Problem Relation Age of Onset    Diabetes Mother     Heart Disease Maternal Aunt      Social History     Tobacco Use    Smoking status: Never Smoker    Smokeless tobacco: Never Used   Substance Use Topics    Alcohol use: No    Drug use: No       Allergies     Allergies   Allergen Reactions    Motrin [Ibuprofen] Hives    Nsaids (Non-Steroidal Anti-Inflammatory Drug) Hives

## 2022-06-24 LAB
T4 FREE SERPL-MCNC: 1 NG/DL (ref 0.8–1.5)
TSH SERPL DL<=0.05 MIU/L-ACNC: 1.44 UIU/ML (ref 0.36–3.74)

## 2022-06-25 LAB — T3 SERPL-MCNC: 129 NG/DL (ref 71–180)

## 2022-11-09 ENCOUNTER — OFFICE VISIT (OUTPATIENT)
Dept: FAMILY MEDICINE CLINIC | Age: 27
End: 2022-11-09
Payer: OTHER GOVERNMENT

## 2022-11-09 VITALS
SYSTOLIC BLOOD PRESSURE: 95 MMHG | WEIGHT: 247 LBS | DIASTOLIC BLOOD PRESSURE: 67 MMHG | TEMPERATURE: 97.1 F | BODY MASS INDEX: 41.1 KG/M2 | HEART RATE: 65 BPM | OXYGEN SATURATION: 99 %

## 2022-11-09 DIAGNOSIS — N91.2 AMENORRHEA: ICD-10-CM

## 2022-11-09 DIAGNOSIS — D22.9 CHANGE IN SKIN MOLE: Primary | ICD-10-CM

## 2022-11-09 LAB
HCG URINE, QL. (POC): NEGATIVE
VALID INTERNAL CONTROL?: YES

## 2022-11-09 PROCEDURE — 99213 OFFICE O/P EST LOW 20 MIN: CPT | Performed by: STUDENT IN AN ORGANIZED HEALTH CARE EDUCATION/TRAINING PROGRAM

## 2022-11-09 PROCEDURE — 81025 URINE PREGNANCY TEST: CPT | Performed by: STUDENT IN AN ORGANIZED HEALTH CARE EDUCATION/TRAINING PROGRAM

## 2022-11-09 NOTE — PROGRESS NOTES
Chief Complaint   Patient presents with    Referral Follow Up     For dermatology      Visit Vitals  BP 95/67 (BP 1 Location: Right arm, BP Patient Position: Sitting, BP Cuff Size: Adult long)   Pulse 65   Temp 97.1 °F (36.2 °C) (Temporal)   Wt 247 lb (112 kg)   SpO2 99%   Breastfeeding Yes   BMI 41.10 kg/m²

## 2022-11-09 NOTE — PROGRESS NOTES
Adrián Pimentel is a 32 y.o. female who presents for Referral Follow Up (For dermatology )    Moles  Noted two small freckles on anterior legs, one on each leg about 1 week ago. One itches. One on right arm has been present for about 1 year, recently growing. One on back is starting to become raised. Tries to be consistent with sunscreen and covering up. Amenorrhea  Has not yet had a period since birth of her baby 6 months ago via C section. Still breastfeeding, but not around the clock, as of 1 month ago. Not pumping. Has not returned to having intercourse.  has cancer, undergoing chemo, had sperm levels checked which were negative. Periods prior ot pregnancy were 28-32 days apart, lasted 3-5 days, never heavy. Saw Dr. Ismael Purvis for postpartum care. Review of Systems   Review of Systems   Constitutional:  Negative for chills and fever. HENT:  Negative for congestion and sore throat. Eyes:  Negative for discharge and redness. Respiratory:  Negative for cough and shortness of breath. Cardiovascular:  Negative for chest pain and palpitations. Gastrointestinal:  Negative for nausea and vomiting. Genitourinary:  Negative for difficulty urinating and dysuria. Musculoskeletal:  Negative for gait problem and neck stiffness. Neurological:  Negative for weakness and numbness. Medical History  No past medical history on file. Medications  Current Outpatient Medications   Medication Sig    omeprazole (PRILOSEC) 40 mg capsule Take 40 mg by mouth daily. (Patient not taking: Reported on 11/9/2022)    prenatal vit-iron fumarate-fa 27 mg iron- 0.8 mg tab tablet Take 1 Tablet by mouth daily. (Patient not taking: Reported on 11/9/2022)     No current facility-administered medications for this visit.        Immunizations   Immunization History   Administered Date(s) Administered    Hepatitis B Vaccine 10/06/2010    Influenza Nasal Vaccine 09/23/2019    Influenza Vaccine Split 10/06/2010, 11/14/2011    Influenza, FLUARIX, FLULAVAL, [address] (age 10 mo+) AND AFLURIA, (age 1 y+), PF, 0.5mL 10/06/2021    TDAP Vaccine 02/01/2009    Tdap 12/24/2019       Allergies   Allergies   Allergen Reactions    Motrin [Ibuprofen] Hives    Nsaids (Non-Steroidal Anti-Inflammatory Drug) Hives       Objective   Vital Signs  Visit Vitals  BP 95/67 (BP 1 Location: Right arm, BP Patient Position: Sitting, BP Cuff Size: Adult long)   Pulse 65   Temp 97.1 °F (36.2 °C) (Temporal)   Wt 247 lb (112 kg)   SpO2 99%   Breastfeeding Yes   BMI 41.10 kg/m²       Physical Examination  Physical Exam  Skin:              Assessment and Plan   Kris Cantu is a 32 y.o. female who presents for Referral Follow Up (For dermatology )    1. Change in skin mole  Skin changes are not overly concerning at this time given size, color, symmetry of moles. She does have several scattered freckles and moles. She would like to be evaluated for dermatologist for future tracking purposes, as well. Would likely benefit from full skin exam and regular follow-up given amount of freckles. 2. Amenorrhea  Possibly related to continued breast-feeding after recent pregnancy. Has only been breast-feeding as needed for the last month. We will do UPT today and follow-up in 1 month, if she has not had a period then. Return in about 4 weeks (around 12/7/2022). Pt was discussed with Dr. Fina Hess (attending physician). I have reviewed patient medical and social history and medications. I have reviewed pertinent labs results and other data. I have discussed the diagnosis with the patient and the intended plan as seen in the above orders. The patient has received an after-visit summary and questions were answered concerning future plans. I have discussed medication side effects and warnings with the patient as well.     Padmini Gaona MD  Resident, [address] [place] [address]  11/09/22

## 2022-11-09 NOTE — PATIENT INSTRUCTIONS
[facility] [address] [place] [address]  Main Office: [phone]  Fax: [phone]  Email: [email or web address]

## 2024-02-17 ENCOUNTER — APPOINTMENT (OUTPATIENT)
Facility: HOSPITAL | Age: 29
End: 2024-02-17
Payer: OTHER GOVERNMENT

## 2024-02-17 ENCOUNTER — HOSPITAL ENCOUNTER (EMERGENCY)
Facility: HOSPITAL | Age: 29
Discharge: HOME | End: 2024-02-17
Attending: STUDENT IN AN ORGANIZED HEALTH CARE EDUCATION/TRAINING PROGRAM
Payer: OTHER GOVERNMENT

## 2024-02-17 VITALS
TEMPERATURE: 98 F | RESPIRATION RATE: 20 BRPM | DIASTOLIC BLOOD PRESSURE: 72 MMHG | WEIGHT: 196 LBS | BODY MASS INDEX: 32.65 KG/M2 | HEIGHT: 65 IN | HEART RATE: 92 BPM | OXYGEN SATURATION: 98 % | SYSTOLIC BLOOD PRESSURE: 126 MMHG

## 2024-02-17 DIAGNOSIS — R04.0 EPISTAXIS: ICD-10-CM

## 2024-02-17 DIAGNOSIS — S02.2XXA CLOSED FRACTURE OF NASAL BONE, INITIAL ENCOUNTER: Primary | ICD-10-CM

## 2024-02-17 PROCEDURE — 99284 EMERGENCY DEPT VISIT MOD MDM: CPT

## 2024-02-17 PROCEDURE — 70486 CT MAXILLOFACIAL W/O DYE: CPT

## 2024-02-17 PROCEDURE — 70450 CT HEAD/BRAIN W/O DYE: CPT

## 2024-02-17 PROCEDURE — 6370000000 HC RX 637 (ALT 250 FOR IP): Performed by: STUDENT IN AN ORGANIZED HEALTH CARE EDUCATION/TRAINING PROGRAM

## 2024-02-17 RX ORDER — HYDROCODONE BITARTRATE AND ACETAMINOPHEN 5; 325 MG/1; MG/1
1 TABLET ORAL
Status: COMPLETED | OUTPATIENT
Start: 2024-02-17 | End: 2024-02-17

## 2024-02-17 RX ORDER — HYDROCODONE BITARTRATE AND ACETAMINOPHEN 5; 325 MG/1; MG/1
1 TABLET ORAL EVERY 6 HOURS PRN
Qty: 12 TABLET | Refills: 0 | Status: SHIPPED | OUTPATIENT
Start: 2024-02-17 | End: 2024-02-19

## 2024-02-17 RX ADMIN — HYDROCODONE BITARTRATE AND ACETAMINOPHEN 1 TABLET: 5; 325 TABLET ORAL at 19:52

## 2024-02-17 ASSESSMENT — PAIN SCALES - GENERAL
PAINLEVEL_OUTOF10: 7
PAINLEVEL_OUTOF10: 8

## 2024-02-17 ASSESSMENT — PAIN DESCRIPTION - LOCATION
LOCATION: NOSE
LOCATION: FACE

## 2024-02-17 ASSESSMENT — PAIN DESCRIPTION - DESCRIPTORS
DESCRIPTORS: ACHING
DESCRIPTORS: ACHING

## 2024-02-17 ASSESSMENT — PAIN - FUNCTIONAL ASSESSMENT
PAIN_FUNCTIONAL_ASSESSMENT: ACTIVITIES ARE NOT PREVENTED
PAIN_FUNCTIONAL_ASSESSMENT: 0-10

## 2024-02-17 ASSESSMENT — PAIN DESCRIPTION - ORIENTATION: ORIENTATION: ANTERIOR

## 2024-02-17 NOTE — ED TRIAGE NOTES
Pt ambulatory to ED c/o bloody nose after getting kneed in the face during BJJ competition at 1600. Denies LOC or vision changes.

## 2024-02-18 NOTE — ED PROVIDER NOTES
[facility] EMERGENCY DEPT  EMERGENCY DEPARTMENT ENCOUNTER      Pt Name: Kristin Martinez  MRN: [id number]  Birthdate 1995  Date of evaluation: 2/17/2024  Provider: Jenny Geronimo MD    CHIEF COMPLAINT       Chief Complaint   Patient presents with    Epistaxis         HISTORY OF PRESENT ILLNESS    Review of Medical Records: N/A    Nursing Triage Notes were reviewed.    HPI    Kristin Martinez is a 28 y.o. previously healthy female who presents to the emergency department for evaluation of epistaxis and facial pain.  Patient states that she was participating in a Brazilian jujitsu tournament prior to arrival.  She reports that she thinks she took a knee to the face around approximately 4 PM.  Denies any loss of consciousness, seizures, vision changes, or vomiting with this episode.  Had immediate pain in her face around her nose after this with a nosebleed.  Was taken to the [facility] medical term for evaluation and her nose was packed with gauze.  Patient denies any chronic medical problems or use of anticoagulants.  Complains primarily of ongoing facial pain around nose and left jaw on arrival to emergency department.  No meds administered prior to arrival. Denies any other acute complaints or injuries on arrival to ED.         PAST MEDICAL HISTORY   No past medical history on file.      SURGICAL HISTORY       Past Surgical History:   Procedure Laterality Date    WISDOM TOOTH EXTRACTION  12/2011         CURRENT MEDICATIONS       Discharge Medication List as of 2/17/2024  9:14 PM          ALLERGIES     Ibuprofen and Nsaids    FAMILY HISTORY       Family History   Problem Relation Age of Onset    Diabetes Mother     Heart Disease Maternal Aunt           SOCIAL HISTORY       Social History     Socioeconomic History    Marital status:    Tobacco Use    Smoking status: Never    Smokeless tobacco: Never   Substance and Sexual Activity    Alcohol use: No    Drug use: No           PHYSICAL EXAM       ED

## 2024-02-18 NOTE — DISCHARGE INSTRUCTIONS
Sinus precautions: Do not blow your nose for at least two weeks. Sneeze with an open mouth. Drink without a straw for one week. Avoid swimming for one month and strenuous exercise. Slight bleeding from the nose is not uncommon.    Call to schedule follow-up with ENT at least 2 to 3 days from now.  Return to the emergency department if you have uncontrolled bleeding from your nose that does not resolve with 20 minutes of direct pressure to the area as we discussed.

## 2024-02-19 ENCOUNTER — OFFICE VISIT (OUTPATIENT)
Age: 29
End: 2024-02-19
Payer: OTHER GOVERNMENT

## 2024-02-19 VITALS
BODY MASS INDEX: 33.06 KG/M2 | DIASTOLIC BLOOD PRESSURE: 69 MMHG | WEIGHT: 198.41 LBS | RESPIRATION RATE: 18 BRPM | HEIGHT: 65 IN | SYSTOLIC BLOOD PRESSURE: 110 MMHG | TEMPERATURE: 98.6 F | HEART RATE: 87 BPM | OXYGEN SATURATION: 96 %

## 2024-02-19 DIAGNOSIS — S02.2XXA CLOSED FRACTURE OF NASAL BONE, INITIAL ENCOUNTER: Primary | ICD-10-CM

## 2024-02-19 PROCEDURE — 99213 OFFICE O/P EST LOW 20 MIN: CPT

## 2024-02-19 RX ORDER — ACETAMINOPHEN 500 MG
500 TABLET ORAL EVERY 6 HOURS PRN
COMMUNITY

## 2024-02-19 SDOH — ECONOMIC STABILITY: INCOME INSECURITY: HOW HARD IS IT FOR YOU TO PAY FOR THE VERY BASICS LIKE FOOD, HOUSING, MEDICAL CARE, AND HEATING?: NOT HARD AT ALL

## 2024-02-19 SDOH — ECONOMIC STABILITY: FOOD INSECURITY: WITHIN THE PAST 12 MONTHS, THE FOOD YOU BOUGHT JUST DIDN'T LAST AND YOU DIDN'T HAVE MONEY TO GET MORE.: NEVER TRUE

## 2024-02-19 SDOH — ECONOMIC STABILITY: FOOD INSECURITY: WITHIN THE PAST 12 MONTHS, YOU WORRIED THAT YOUR FOOD WOULD RUN OUT BEFORE YOU GOT MONEY TO BUY MORE.: NEVER TRUE

## 2024-02-19 SDOH — ECONOMIC STABILITY: HOUSING INSECURITY
IN THE LAST 12 MONTHS, WAS THERE A TIME WHEN YOU DID NOT HAVE A STEADY PLACE TO SLEEP OR SLEPT IN A SHELTER (INCLUDING NOW)?: NO

## 2024-02-19 ASSESSMENT — PATIENT HEALTH QUESTIONNAIRE - PHQ9
SUM OF ALL RESPONSES TO PHQ QUESTIONS 1-9: 0
SUM OF ALL RESPONSES TO PHQ QUESTIONS 1-9: 0
SUM OF ALL RESPONSES TO PHQ9 QUESTIONS 1 & 2: 0
1. LITTLE INTEREST OR PLEASURE IN DOING THINGS: 0
SUM OF ALL RESPONSES TO PHQ QUESTIONS 1-9: 0
2. FEELING DOWN, DEPRESSED OR HOPELESS: 0
SUM OF ALL RESPONSES TO PHQ QUESTIONS 1-9: 0

## 2024-02-26 ENCOUNTER — OFFICE VISIT (OUTPATIENT)
Age: 29
End: 2024-02-26
Payer: OTHER GOVERNMENT

## 2024-02-26 VITALS
SYSTOLIC BLOOD PRESSURE: 106 MMHG | WEIGHT: 197.53 LBS | HEART RATE: 70 BPM | OXYGEN SATURATION: 99 % | HEIGHT: 65 IN | DIASTOLIC BLOOD PRESSURE: 64 MMHG | RESPIRATION RATE: 18 BRPM | TEMPERATURE: 98.2 F | BODY MASS INDEX: 32.91 KG/M2

## 2024-02-26 DIAGNOSIS — S02.2XXD CLOSED FRACTURE OF NASAL BONE WITH ROUTINE HEALING, SUBSEQUENT ENCOUNTER: Primary | ICD-10-CM

## 2024-02-26 PROCEDURE — 99212 OFFICE O/P EST SF 10 MIN: CPT

## 2024-02-26 ASSESSMENT — PATIENT HEALTH QUESTIONNAIRE - PHQ9
SUM OF ALL RESPONSES TO PHQ QUESTIONS 1-9: 0
1. LITTLE INTEREST OR PLEASURE IN DOING THINGS: 0
SUM OF ALL RESPONSES TO PHQ QUESTIONS 1-9: 0
SUM OF ALL RESPONSES TO PHQ9 QUESTIONS 1 & 2: 0
2. FEELING DOWN, DEPRESSED OR HOPELESS: 0

## 2024-02-26 NOTE — PROGRESS NOTES
[facility]  [address] [place] [address]   Office [phone], Fax [phone]      Chief Complaint:     Chief Complaint   Patient presents with    Follow-up       SUBJECTIVE  Kristin Martinez is an 28 y.o. female who presents for follow up evaluation of nasal bone fracture    #nasal bone fracture  - patient was seen in the ER on 2/17/24 after injury during Brazilian jujitsu tournament  - CT at that time showed acute nasal bone fracture  - possible septal hematoma vs normal edema at that time  - patient scheduled with ENT on 3/7/24  - she reports that the pain has improved. She had upper respiratory symptoms last week with rhinorrhea that has continued to improve  - denies SOB, difficulty breathing, epistaxis      Allergies   Allergies   Allergen Reactions    Ibuprofen Hives    Nsaids Hives         Medications   Current Outpatient Medications   Medication Sig    acetaminophen (TYLENOL) 500 MG tablet Take 1 tablet by mouth every 6 hours as needed for Pain     No current facility-administered medications for this visit.         Past surgical, medical and social history reviewed and updated as relevant to presenting concerns.     ROS: See HPI      OBJECTIVE  /64 (Site: Left Upper Arm, Position: Sitting, Cuff Size: Medium Adult)   Pulse 70   Temp 98.2 °F (36.8 °C) (Oral)   Resp 18   Ht 1.651 m (5' 5\")   Wt 89.6 kg (197 lb 8.5 oz)   LMP 02/09/2024 (Exact Date)   SpO2 99%   BMI 32.87 kg/m²     Physical Exam  General appearance - alert, well appearing, and in no distress  Eyes - normal sclera  Nose - improved swelling to bilateral nasal septum. No evidence of hematoma. Moderate tenderness to bridge of nose  Mouth - mucous membranes moist, pharynx normal without lesions  Neck - supple, no significant adenopathy  Chest - no respiratory distress, symmetric air entry  Neurological - alert, oriented, normal speech  Skin - normal coloration and turgor, no rashes, no

## 2024-02-26 NOTE — PROGRESS NOTES
Identified pt with two pt identifiers(name and ). Reviewed record in preparation for visit and have obtained necessary documentation.  Chief Complaint   Patient presents with    Follow-up        Health Maintenance Due   Topic    COVID-19 Vaccine (1)    Hepatitis B vaccine (2 of 3 - 3-dose series)    Varicella vaccine (1 of 2 - 2-dose childhood series)    Pap smear     Flu vaccine (1)       Vitals:    24 1436 24 1443   BP: (!) 97/57 106/64   Site: Right Upper Arm Left Upper Arm   Position: Sitting Sitting   Cuff Size: Medium Adult Medium Adult   Pulse: 70    Resp: 18    Temp: 98.2 °F (36.8 °C)    TempSrc: Oral    SpO2: 99%    Weight: 89.6 kg (197 lb 8.5 oz)    Height: 1.651 m (5' 5\")            Coordination of Care Questionnaire:  :   1. Have you been to the ER, urgent care clinic since your last visit?  Hospitalized since your last visit?No    2. Have you seen or consulted any other health care providers outside of the [facility] since your last visit?  Include any pap smears or colon screening. No    This patient is accompanied in the office by her self.  I have received verbal consent from Kristin Martinez to discuss any/all medical information while they are present in the room.

## 2024-03-07 ENCOUNTER — OFFICE VISIT (OUTPATIENT)
Age: 29
End: 2024-03-07
Payer: OTHER GOVERNMENT

## 2024-03-07 VITALS
WEIGHT: 197 LBS | BODY MASS INDEX: 32.82 KG/M2 | SYSTOLIC BLOOD PRESSURE: 122 MMHG | HEART RATE: 66 BPM | HEIGHT: 65 IN | DIASTOLIC BLOOD PRESSURE: 80 MMHG | OXYGEN SATURATION: 98 %

## 2024-03-07 DIAGNOSIS — S09.93XA FACIAL INJURY, INITIAL ENCOUNTER: Primary | ICD-10-CM

## 2024-03-07 DIAGNOSIS — S02.2XXA CLOSED FRACTURE OF NASAL BONE, INITIAL ENCOUNTER: ICD-10-CM

## 2024-03-07 DIAGNOSIS — J34.2 DEVIATED NASAL BONE: ICD-10-CM

## 2024-03-07 PROCEDURE — 99204 OFFICE O/P NEW MOD 45 MIN: CPT | Performed by: STUDENT IN AN ORGANIZED HEALTH CARE EDUCATION/TRAINING PROGRAM

## 2024-03-07 NOTE — PROGRESS NOTES
Subjective:   Kristin Martinez   28 y.o.   1995     Refered by: No referring provider defined for this encounter.     New Patient Visit  Chief Compliant: nasal bone fracture.     History of Present Illness:  Kristin Martinez is a 28 y.o. female with no past medical history, who presents today for evaluation of nasal bone fracture.     Facial trauma at [facility] on 2/17/2024.  Patient had significant epistaxis and was seen at the ED.  CT scan at the time shows a displaced nasal bone fracture with bony pyramid deviated to the right.  Nasal septum was not fractured and is midline.  Patient denies any significant nasal congestion, however reports that she is having increased snoring at night.    Patient is significantly concerned with appearance of the deviation.  Has a new dorsal hump after fracture which is concerning to her as well and is bony vault deviation to the right which she is very concerned about fixing.    ED notes reviewed.     Review of Systems  Consitutional: denies fever, excessive weight gain or loss.  Eyes: denies diplopia, eye pain.  Integumentary: denies new concerning skin lesions.  Ears, Nose, Mouth, Throat: denies except as per HPI.  Endocrine: denies hot or cold intolerance, increased thirst.  Respiratory: denies cough, hemoptysis, wheezing  Gastrointestinal: denies trouble swallowing, nausea, emesis, regurgitation  Musculoskeletal: denies muscle weakness or wasting  Cardiovascular: denies chest pain, shortness of breath  Neurologic: denies seizures, numbness or tingling, syncope  Hematologic: denies easy bleeding or bruising       Past Medical History:   Diagnosis Date    Nose fracture      Past Surgical History:   Procedure Laterality Date    WISDOM TOOTH EXTRACTION  12/2011      Family History   Problem Relation Age of Onset    Diabetes Mother     Psoriasis Mother     Heart Disease Maternal Aunt      Social History     Tobacco Use    Smoking status: Never     Passive

## 2024-03-11 ENCOUNTER — PREP FOR PROCEDURE (OUTPATIENT)
Age: 29
End: 2024-03-11

## 2024-03-11 DIAGNOSIS — J34.2 DEVIATED NASAL BONE: ICD-10-CM

## 2024-03-11 PROBLEM — S02.2XXA CLOSED FRACTURE OF NASAL BONES: Status: ACTIVE | Noted: 2024-03-11

## 2024-03-11 PROBLEM — S09.93XA INJURY OF FACE: Status: ACTIVE | Noted: 2024-03-11

## 2024-03-15 ENCOUNTER — TELEPHONE (OUTPATIENT)
Age: 29
End: 2024-03-15

## 2024-03-15 NOTE — TELEPHONE ENCOUNTER
LVM informing pt that she needs to contact her PCM and have them put in a referral for her to be seen by our office so they can approve the referral for authorization of her surgery scheduled with Dr. Smith on 3/26. I explained that it only takes a couple of days to get approved, so we still have time to get her approved before the date of service. I explained that I need to have her approval on hand by 3/25 or her surgery will be cancelled.    Left my direct line for return call.

## 2024-03-21 ENCOUNTER — TELEPHONE (OUTPATIENT)
Age: 29
End: 2024-03-21

## 2024-03-21 NOTE — TELEPHONE ENCOUNTER
Called pt to verify she wanted to cancel her surgery with Dr. Smiht after speaking with Bozena at [facility].     Pt confirmed cancellation stating she has a trip out of the country scheduled soon and does not want to postpone.    I explained that [facility] approved her authorization for the open nasal fracture through June 19, 2024 so if she wants to reschedule after her trip, she can call to make a follow up appt.    Pt expressed understanding and stated she will call back after her trip to reschedule.

## 2024-04-09 ENCOUNTER — APPOINTMENT (OUTPATIENT)
Facility: HOSPITAL | Age: 29
End: 2024-04-09
Payer: OTHER GOVERNMENT

## 2024-04-09 ENCOUNTER — HOSPITAL ENCOUNTER (EMERGENCY)
Facility: HOSPITAL | Age: 29
Discharge: HOME | End: 2024-04-10
Attending: EMERGENCY MEDICINE
Payer: OTHER GOVERNMENT

## 2024-04-09 DIAGNOSIS — R07.89 ATYPICAL CHEST PAIN: Primary | ICD-10-CM

## 2024-04-09 LAB
ALBUMIN SERPL-MCNC: 3.3 G/DL (ref 3.5–5)
ALBUMIN/GLOB SERPL: 0.8 (ref 1.1–2.2)
ALP SERPL-CCNC: 123 U/L (ref 45–117)
ALT SERPL-CCNC: 51 U/L (ref 12–78)
ANION GAP SERPL CALC-SCNC: 3 MMOL/L (ref 5–15)
APPEARANCE UR: CLEAR
AST SERPL-CCNC: 74 U/L (ref 15–37)
BACTERIA URNS QL MICRO: NEGATIVE /HPF
BASOPHILS # BLD: 0 K/UL (ref 0–0.1)
BASOPHILS NFR BLD: 0 % (ref 0–1)
BILIRUB SERPL-MCNC: 0.5 MG/DL (ref 0.2–1)
BILIRUB UR QL: NEGATIVE
BUN SERPL-MCNC: 11 MG/DL (ref 6–20)
BUN/CREAT SERPL: 13 (ref 12–20)
CALCIUM SERPL-MCNC: 9.4 MG/DL (ref 8.5–10.1)
CHLORIDE SERPL-SCNC: 104 MMOL/L (ref 97–108)
CO2 SERPL-SCNC: 32 MMOL/L (ref 21–32)
COLOR UR: ABNORMAL
COMMENT:: NORMAL
CREAT SERPL-MCNC: 0.86 MG/DL (ref 0.55–1.02)
DIFFERENTIAL METHOD BLD: ABNORMAL
EOSINOPHIL # BLD: 0.4 K/UL (ref 0–0.4)
EOSINOPHIL NFR BLD: 3 % (ref 0–7)
EPITH CASTS URNS QL MICRO: ABNORMAL /LPF
ERYTHROCYTE [DISTWIDTH] IN BLOOD BY AUTOMATED COUNT: 12.3 % (ref 11.5–14.5)
GLOBULIN SER CALC-MCNC: 4 G/DL (ref 2–4)
GLUCOSE SERPL-MCNC: 118 MG/DL (ref 65–100)
GLUCOSE UR STRIP.AUTO-MCNC: NEGATIVE MG/DL
HCG UR QL: NEGATIVE
HCT VFR BLD AUTO: 38 % (ref 35–47)
HGB BLD-MCNC: 12.8 G/DL (ref 11.5–16)
HGB UR QL STRIP: NEGATIVE
HYALINE CASTS URNS QL MICRO: ABNORMAL /LPF (ref 0–2)
IMM GRANULOCYTES # BLD AUTO: 0 K/UL (ref 0–0.04)
IMM GRANULOCYTES NFR BLD AUTO: 0 % (ref 0–0.5)
KETONES UR QL STRIP.AUTO: NEGATIVE MG/DL
LEUKOCYTE ESTERASE UR QL STRIP.AUTO: NEGATIVE
LIPASE SERPL-CCNC: 39 U/L (ref 13–75)
LYMPHOCYTES # BLD: 2.8 K/UL (ref 0.8–3.5)
LYMPHOCYTES NFR BLD: 23 % (ref 12–49)
MCH RBC QN AUTO: 27.9 PG (ref 26–34)
MCHC RBC AUTO-ENTMCNC: 33.7 G/DL (ref 30–36.5)
MCV RBC AUTO: 83 FL (ref 80–99)
MONOCYTES # BLD: 1 K/UL (ref 0–1)
MONOCYTES NFR BLD: 8 % (ref 5–13)
NEUTS SEG # BLD: 8 K/UL (ref 1.8–8)
NEUTS SEG NFR BLD: 66 % (ref 32–75)
NITRITE UR QL STRIP.AUTO: NEGATIVE
NRBC # BLD: 0 K/UL (ref 0–0.01)
NRBC BLD-RTO: 0 PER 100 WBC
PH UR STRIP: 7 (ref 5–8)
PLATELET # BLD AUTO: 262 K/UL (ref 150–400)
PMV BLD AUTO: 10.4 FL (ref 8.9–12.9)
POTASSIUM SERPL-SCNC: 3.8 MMOL/L (ref 3.5–5.1)
PROT SERPL-MCNC: 7.3 G/DL (ref 6.4–8.2)
PROT UR STRIP-MCNC: NEGATIVE MG/DL
RBC # BLD AUTO: 4.58 M/UL (ref 3.8–5.2)
RBC #/AREA URNS HPF: ABNORMAL /HPF (ref 0–5)
SODIUM SERPL-SCNC: 139 MMOL/L (ref 136–145)
SP GR UR REFRACTOMETRY: 1.02 (ref 1–1.03)
SPECIMEN HOLD: NORMAL
SPECIMEN HOLD: NORMAL
TROPONIN I SERPL HS-MCNC: <4 NG/L (ref 0–51)
UROBILINOGEN UR QL STRIP.AUTO: 1 EU/DL (ref 0.2–1)
WBC # BLD AUTO: 12.2 K/UL (ref 3.6–11)
WBC URNS QL MICRO: ABNORMAL /HPF (ref 0–4)

## 2024-04-09 PROCEDURE — 81001 URINALYSIS AUTO W/SCOPE: CPT

## 2024-04-09 PROCEDURE — 81025 URINE PREGNANCY TEST: CPT

## 2024-04-09 PROCEDURE — 85025 COMPLETE CBC W/AUTO DIFF WBC: CPT

## 2024-04-09 PROCEDURE — 80053 COMPREHEN METABOLIC PANEL: CPT

## 2024-04-09 PROCEDURE — 84484 ASSAY OF TROPONIN QUANT: CPT

## 2024-04-09 PROCEDURE — 6360000002 HC RX W HCPCS: Performed by: EMERGENCY MEDICINE

## 2024-04-09 PROCEDURE — 71046 X-RAY EXAM CHEST 2 VIEWS: CPT

## 2024-04-09 PROCEDURE — 96374 THER/PROPH/DIAG INJ IV PUSH: CPT

## 2024-04-09 PROCEDURE — 83690 ASSAY OF LIPASE: CPT

## 2024-04-09 PROCEDURE — 99285 EMERGENCY DEPT VISIT HI MDM: CPT

## 2024-04-09 PROCEDURE — 93005 ELECTROCARDIOGRAM TRACING: CPT | Performed by: EMERGENCY MEDICINE

## 2024-04-09 PROCEDURE — 36415 COLL VENOUS BLD VENIPUNCTURE: CPT

## 2024-04-09 RX ORDER — ONDANSETRON 2 MG/ML
4 INJECTION INTRAMUSCULAR; INTRAVENOUS ONCE
Status: COMPLETED | OUTPATIENT
Start: 2024-04-09 | End: 2024-04-09

## 2024-04-09 RX ADMIN — ONDANSETRON 4 MG: 2 INJECTION INTRAMUSCULAR; INTRAVENOUS at 23:31

## 2024-04-09 ASSESSMENT — ENCOUNTER SYMPTOMS
BACK PAIN: 0
COLOR CHANGE: 0
SHORTNESS OF BREATH: 0
NAUSEA: 1
ABDOMINAL PAIN: 0
DIARRHEA: 0
SORE THROAT: 0
VOMITING: 1
COUGH: 0

## 2024-04-09 ASSESSMENT — PAIN - FUNCTIONAL ASSESSMENT: PAIN_FUNCTIONAL_ASSESSMENT: 0-10

## 2024-04-09 ASSESSMENT — PAIN SCALES - GENERAL: PAINLEVEL_OUTOF10: 0

## 2024-04-10 VITALS
HEIGHT: 66 IN | SYSTOLIC BLOOD PRESSURE: 97 MMHG | WEIGHT: 195 LBS | OXYGEN SATURATION: 96 % | BODY MASS INDEX: 31.34 KG/M2 | RESPIRATION RATE: 15 BRPM | HEART RATE: 50 BPM | TEMPERATURE: 97.9 F | DIASTOLIC BLOOD PRESSURE: 53 MMHG

## 2024-04-10 LAB
EKG ATRIAL RATE: 62 BPM
EKG DIAGNOSIS: NORMAL
EKG P AXIS: 46 DEGREES
EKG P-R INTERVAL: 186 MS
EKG Q-T INTERVAL: 396 MS
EKG QRS DURATION: 104 MS
EKG QTC CALCULATION (BAZETT): 401 MS
EKG R AXIS: 45 DEGREES
EKG T AXIS: 40 DEGREES
EKG VENTRICULAR RATE: 62 BPM
TROPONIN I SERPL HS-MCNC: <4 NG/L (ref 0–51)

## 2024-04-10 PROCEDURE — 36415 COLL VENOUS BLD VENIPUNCTURE: CPT

## 2024-04-10 PROCEDURE — 93010 ELECTROCARDIOGRAM REPORT: CPT | Performed by: INTERNAL MEDICINE

## 2024-04-10 PROCEDURE — 84484 ASSAY OF TROPONIN QUANT: CPT

## 2024-04-10 ASSESSMENT — PAIN SCALES - GENERAL: PAINLEVEL_OUTOF10: 0

## 2024-04-10 NOTE — ED NOTES
Bedside and Verbal shift change report given to CHRISS Herndon (oncoming nurse) by CHRISS Dos Santos (offgoing nurse). Report included the following information Nurse Handoff Report, ED SBAR, MAR, Recent Results, Quality Measures, and Neuro Assessment.

## 2024-04-10 NOTE — ED PROVIDER NOTES
atraumatic.   Eyes:      Extraocular Movements: Extraocular movements intact.      Conjunctiva/sclera: Conjunctivae normal.   Cardiovascular:      Rate and Rhythm: Normal rate and regular rhythm.      Heart sounds: Normal heart sounds.   Pulmonary:      Effort: Pulmonary effort is normal.      Breath sounds: Normal breath sounds.   Abdominal:      General: There is no distension.      Palpations: Abdomen is soft.      Tenderness: There is no abdominal tenderness.   Musculoskeletal:         General: Normal range of motion.      Cervical back: Normal range of motion.   Skin:     General: Skin is warm and dry.   Neurological:      General: No focal deficit present.      Mental Status: She is alert.             EMERGENCY DEPARTMENT COURSE and DIFFERENTIAL DIAGNOSIS/MDM:       Medical Decision Making  Patient presents with sudden onset chest pain this evening which resolved after episode of vomiting.  Suspect may have GI cause.  She denies any current abdominal pain, abdomen exam is soft and nontender.  Exam without evidence of volume overload so doubt heart failure. EKG without signs of active ischemia.  Initial troponin within normal limits, due to timing of pain will check second 2-hour level.  Presentation not consistent with acute PE (PERC negative),pneumothorax (not visualized on chest xr), thoracic aortic dissection, pericarditis, tamponade, pneumonia (no infectious symptoms, clear chest xr), myocarditis (no recent illness, neg trop), pancreatitis (normal lipase).     12:14 AM  Change of shift.  Care of patient signed over to Dr. Walker. Awaiting results of serial troponin level. If normal and patient continues to be asymptomatic suspect likely discharge.     Amount and/or Complexity of Data Reviewed  Labs: ordered.  Radiology: ordered.  ECG/medicine tests: ordered.    Risk  Prescription drug management.        Procedures    ED Course as of 04/09/24 3907   Tue Apr 09, 2024   6422 EKG: Normal sinus rhythm; rate of

## 2024-04-10 NOTE — ED TRIAGE NOTES
Pt arrived via EMS w/ sudden/acute abd/lower chest pain, like she was \"too full\", that radiated to belly button. Pt endorses V/N. Pain has \"lightened up\".     Pt has had gallbladder removed.

## 2024-04-15 ENCOUNTER — TELEPHONE (OUTPATIENT)
Age: 29
End: 2024-04-15

## 2024-04-15 LAB
EKG ATRIAL RATE: 62 BPM
EKG DIAGNOSIS: NORMAL
EKG P AXIS: 46 DEGREES
EKG P-R INTERVAL: 186 MS
EKG Q-T INTERVAL: 396 MS
EKG QRS DURATION: 104 MS
EKG QTC CALCULATION (BAZETT): 401 MS
EKG R AXIS: 45 DEGREES
EKG T AXIS: 40 DEGREES
EKG VENTRICULAR RATE: 62 BPM

## 2024-04-15 NOTE — TELEPHONE ENCOUNTER
Received transferred call from [facility]: Daly  [facility] Red Flag: Yes - swelling    Spoke with patient who identified self with two patient identifiers(name and ).    Patient Active Problem List   Diagnosis    Supervision of normal first pregnancy, antepartum    Injury of face    Closed fracture of nasal bones    Deviated nasal bone       Chief Complaint/Subjective: Woke up with swelling hands and feet    Current Symptoms:   Swelling  Bilateral Hands  Bilateral Feet  Pain in joints of hands    Associated Symptoms:   Patient denies any difficulty breathing/Shortness of breath    Onset: 04/15/24    Temperature: Denies     Pain Severity: 3      Location:  joints in bilateral hands    Pain Quality: NA    Better/Worse: worsens with: salty foods    What has been tried: drinking water, elevating feet    Recommended disposition: Schedule appointment for evaluation    Future Appointments   Date Time Provider Department Center   2024  8:40 AM Attila Nieto MD [facility] AMB       Recommendations:   Notify office of any changes  If symptoms become worse or if difficulty breathing/shortness of breath develop, go to the nearest emergency room  If no urine output in 8 hours, go to nearest ER for evaluation    Reason for Disposition:   No difficulty breathing/shortness of breath    Patient agreed and verbalized understanding to advice provided.    Ben Ybarra RN

## 2024-04-16 ENCOUNTER — OFFICE VISIT (OUTPATIENT)
Age: 29
End: 2024-04-16
Payer: OTHER GOVERNMENT

## 2024-04-16 VITALS
SYSTOLIC BLOOD PRESSURE: 114 MMHG | RESPIRATION RATE: 18 BRPM | TEMPERATURE: 97.8 F | HEIGHT: 66 IN | BODY MASS INDEX: 35.52 KG/M2 | WEIGHT: 221 LBS | HEART RATE: 66 BPM | DIASTOLIC BLOOD PRESSURE: 75 MMHG | OXYGEN SATURATION: 97 %

## 2024-04-16 DIAGNOSIS — R00.2 PALPITATIONS: ICD-10-CM

## 2024-04-16 DIAGNOSIS — R07.9 CHEST PAIN, UNSPECIFIED TYPE: Primary | ICD-10-CM

## 2024-04-16 DIAGNOSIS — R53.83 FATIGUE, UNSPECIFIED TYPE: ICD-10-CM

## 2024-04-16 LAB
ANION GAP SERPL CALC-SCNC: 1 MMOL/L (ref 5–15)
BUN SERPL-MCNC: 8 MG/DL (ref 6–20)
BUN/CREAT SERPL: 12 (ref 12–20)
CALCIUM SERPL-MCNC: 9.3 MG/DL (ref 8.5–10.1)
CHLORIDE SERPL-SCNC: 108 MMOL/L (ref 97–108)
CO2 SERPL-SCNC: 29 MMOL/L (ref 21–32)
COMMENT:: NORMAL
CREAT SERPL-MCNC: 0.67 MG/DL (ref 0.55–1.02)
CREAT UR-MCNC: 248 MG/DL
EST. AVERAGE GLUCOSE BLD GHB EST-MCNC: 94 MG/DL
FERRITIN SERPL-MCNC: 48 NG/ML (ref 26–388)
GLUCOSE SERPL-MCNC: 90 MG/DL (ref 65–100)
HBA1C MFR BLD: 4.9 % (ref 4–5.6)
MICROALBUMIN UR-MCNC: 1.32 MG/DL
MICROALBUMIN/CREAT UR-RTO: 5 MG/G (ref 0–30)
NT PRO BNP: 131 PG/ML
POTASSIUM SERPL-SCNC: 4.2 MMOL/L (ref 3.5–5.1)
SODIUM SERPL-SCNC: 138 MMOL/L (ref 136–145)
SPECIMEN HOLD: NORMAL
T4 FREE SERPL-MCNC: 1.2 NG/DL (ref 0.8–1.5)
TSH SERPL DL<=0.05 MIU/L-ACNC: 1.63 UIU/ML (ref 0.36–3.74)

## 2024-04-16 PROCEDURE — 99214 OFFICE O/P EST MOD 30 MIN: CPT | Performed by: FAMILY MEDICINE

## 2024-04-16 ASSESSMENT — PATIENT HEALTH QUESTIONNAIRE - PHQ9
SUM OF ALL RESPONSES TO PHQ QUESTIONS 1-9: 0
2. FEELING DOWN, DEPRESSED OR HOPELESS: NOT AT ALL
SUM OF ALL RESPONSES TO PHQ QUESTIONS 1-9: 0
SUM OF ALL RESPONSES TO PHQ9 QUESTIONS 1 & 2: 0
SUM OF ALL RESPONSES TO PHQ QUESTIONS 1-9: 0
SUM OF ALL RESPONSES TO PHQ QUESTIONS 1-9: 0
1. LITTLE INTEREST OR PLEASURE IN DOING THINGS: NOT AT ALL

## 2024-04-16 NOTE — PROGRESS NOTES
Chief Complaint   Patient presents with    Hand Pain     Patient states that yesterday her hands and feet were swollen. Patient drinks plenty of water.     Today the swelling has gone down, except the joints in her hands still hurt. When patient squeezes her hands then it is very uncomfortable.      Vitals:    04/16/24 0912   BP: 114/75   Site: Right Upper Arm   Position: Sitting   Cuff Size: Medium Adult   Pulse: 66   Resp: 18   Temp: 97.8 °F (36.6 °C)   TempSrc: Temporal   SpO2: 97%   Weight: 100.2 kg (221 lb)   Height: 1.676 m (5' 6\")     \"Have you been to the ER, urgent care clinic since your last visit?  Hospitalized since your last visit?\"    YES - When: approximately 7 days ago.  Where and Why: [facility] for acid reflux. .    “Have you seen or consulted any other health care providers outside of [facility] since your last visit?”    NO            Click Here for Release of Records Request  
Narrative    Not on file     Social Determinants of Health     Financial Resource Strain: Low Risk  (2/19/2024)    Overall Financial Resource Strain (CARDIA)     Difficulty of Paying Living Expenses: Not hard at all   Food Insecurity: No Food Insecurity (2/19/2024)    Hunger Vital Sign     Worried About Running Out of Food in the Last Year: Never true     Ran Out of Food in the Last Year: Never true   Transportation Needs: Unknown (2/19/2024)    PRAPARE - Transportation     Lack of Transportation (Medical): Not on file     Lack of Transportation (Non-Medical): No   Physical Activity: Not on file   Stress: Not on file   Social Connections: Not on file   Intimate Partner Violence: Not on file   Housing Stability: Unknown (2/19/2024)    Housing Stability Vital Sign     Unable to Pay for Housing in the Last Year: Not on file     Number of Places Lived in the Last Year: Not on file     Unstable Housing in the Last Year: No            Current Medications/Allergies     Current Outpatient Medications   Medication Sig Dispense Refill    acetaminophen (TYLENOL) 500 MG tablet Take 1 tablet by mouth every 6 hours as needed for Pain       No current facility-administered medications for this visit.     Allergies   Allergen Reactions    Ibuprofen Hives    Nsaids Hives

## 2024-04-23 PROBLEM — Z34.00 SUPERVISION OF NORMAL FIRST PREGNANCY, ANTEPARTUM: Status: RESOLVED | Noted: 2021-10-08 | Resolved: 2024-04-23

## 2025-02-08 ENCOUNTER — HOSPITAL ENCOUNTER (EMERGENCY)
Facility: HOSPITAL | Age: 30
Discharge: HOME | End: 2025-02-08
Attending: EMERGENCY MEDICINE
Payer: OTHER GOVERNMENT

## 2025-02-08 ENCOUNTER — APPOINTMENT (OUTPATIENT)
Facility: HOSPITAL | Age: 30
End: 2025-02-08
Payer: OTHER GOVERNMENT

## 2025-02-08 VITALS
HEIGHT: 66 IN | SYSTOLIC BLOOD PRESSURE: 105 MMHG | OXYGEN SATURATION: 100 % | DIASTOLIC BLOOD PRESSURE: 70 MMHG | BODY MASS INDEX: 35.36 KG/M2 | TEMPERATURE: 98 F | WEIGHT: 220 LBS | RESPIRATION RATE: 16 BRPM | HEART RATE: 69 BPM

## 2025-02-08 DIAGNOSIS — S16.1XXA STRAIN OF NECK MUSCLE, INITIAL ENCOUNTER: Primary | ICD-10-CM

## 2025-02-08 PROCEDURE — 72040 X-RAY EXAM NECK SPINE 2-3 VW: CPT

## 2025-02-08 PROCEDURE — 6360000002 HC RX W HCPCS: Performed by: STUDENT IN AN ORGANIZED HEALTH CARE EDUCATION/TRAINING PROGRAM

## 2025-02-08 PROCEDURE — 94761 N-INVAS EAR/PLS OXIMETRY MLT: CPT

## 2025-02-08 PROCEDURE — 99284 EMERGENCY DEPT VISIT MOD MDM: CPT

## 2025-02-08 PROCEDURE — 6370000000 HC RX 637 (ALT 250 FOR IP): Performed by: STUDENT IN AN ORGANIZED HEALTH CARE EDUCATION/TRAINING PROGRAM

## 2025-02-08 PROCEDURE — 96372 THER/PROPH/DIAG INJ SC/IM: CPT

## 2025-02-08 RX ORDER — CYCLOBENZAPRINE HCL 10 MG
10 TABLET ORAL 3 TIMES DAILY PRN
Qty: 21 TABLET | Refills: 0 | Status: SHIPPED | OUTPATIENT
Start: 2025-02-08 | End: 2025-02-18

## 2025-02-08 RX ORDER — ACETAMINOPHEN 500 MG
1000 TABLET ORAL
Status: COMPLETED | OUTPATIENT
Start: 2025-02-08 | End: 2025-02-08

## 2025-02-08 RX ORDER — LIDOCAINE 4 G/G
1 PATCH TOPICAL
Status: DISCONTINUED | OUTPATIENT
Start: 2025-02-08 | End: 2025-02-08 | Stop reason: HOSPADM

## 2025-02-08 RX ORDER — LIDOCAINE 50 MG/G
1 PATCH TOPICAL DAILY
Qty: 10 PATCH | Refills: 0 | Status: SHIPPED | OUTPATIENT
Start: 2025-02-08 | End: 2025-02-18

## 2025-02-08 RX ORDER — DEXAMETHASONE SODIUM PHOSPHATE 10 MG/ML
10 INJECTION, SOLUTION INTRAMUSCULAR; INTRAVENOUS ONCE
Status: COMPLETED | OUTPATIENT
Start: 2025-02-08 | End: 2025-02-08

## 2025-02-08 RX ORDER — CYCLOBENZAPRINE HCL 10 MG
10 TABLET ORAL
Status: COMPLETED | OUTPATIENT
Start: 2025-02-08 | End: 2025-02-08

## 2025-02-08 RX ADMIN — ACETAMINOPHEN 1000 MG: 500 TABLET ORAL at 11:35

## 2025-02-08 RX ADMIN — DEXAMETHASONE SODIUM PHOSPHATE 10 MG: 10 INJECTION, SOLUTION INTRAMUSCULAR; INTRAVENOUS at 11:37

## 2025-02-08 RX ADMIN — CYCLOBENZAPRINE HYDROCHLORIDE 10 MG: 10 TABLET, FILM COATED ORAL at 11:35

## 2025-02-08 ASSESSMENT — ENCOUNTER SYMPTOMS
VOMITING: 0
COUGH: 0
DIARRHEA: 0
NAUSEA: 0
SORE THROAT: 0
SHORTNESS OF BREATH: 0
ABDOMINAL PAIN: 0
EYE PAIN: 0

## 2025-02-08 ASSESSMENT — PAIN DESCRIPTION - LOCATION
LOCATION: NECK
LOCATION: NECK;SHOULDER

## 2025-02-08 ASSESSMENT — PAIN - FUNCTIONAL ASSESSMENT
PAIN_FUNCTIONAL_ASSESSMENT: 0-10
PAIN_FUNCTIONAL_ASSESSMENT: 0-10

## 2025-02-08 ASSESSMENT — PAIN DESCRIPTION - ORIENTATION
ORIENTATION: LEFT
ORIENTATION: LEFT

## 2025-02-08 ASSESSMENT — PAIN SCALES - GENERAL
PAINLEVEL_OUTOF10: 6
PAINLEVEL_OUTOF10: 6
PAINLEVEL_OUTOF10: 4

## 2025-02-08 ASSESSMENT — PAIN DESCRIPTION - DESCRIPTORS
DESCRIPTORS: ACHING
DESCRIPTORS: ACHING

## 2025-02-08 NOTE — ED NOTES
DC paperwork reviewed, pt verbalized understanding. Pt A/O x4, ambulatory at time of DC, no distress noted.

## 2025-02-08 NOTE — DISCHARGE INSTRUCTIONS
Take new medications as prescribed.  You can take new medications or Tylenol as needed for pain.  Alternating ice and heat on neck can help as well.  Please avoid lifting anything heavier than 10 pounds and stretch as you are able.  Please follow-up with your PCP as soon as possible.

## 2025-02-08 NOTE — ED PROVIDER NOTES
[facility] EMERGENCY DEPARTMENT  EMERGENCY DEPARTMENT ENCOUNTER      Pt Name: Kristin Martinez  MRN: [id number]  Birthdate 1995  Date of evaluation: 2/8/2025  Provider: [facility]    CHIEF COMPLAINT       Chief Complaint   Patient presents with    Neck Pain         HISTORY OF PRESENT ILLNESS   (Location/Symptom, Timing/Onset, Context/Setting, Quality, Duration, Modifying Factors, Severity)  Note limiting factors.   29 y.o. female presents to ED with neck pain. Patient reports that on Thursday, 02/06 she was doing jiu-jitsu and did a new maneuver and shortly after started feeling some L sided neck pain. She reports that this has persisted since then and now she is also feeling numbness and tingling radiating down her left arm.  Denies any headache, vision changes, chest pain or shortness of breath. She has tried tylenol with little relief, most recently last night.            Review of External Medical Records:     Nursing Notes were reviewed.    REVIEW OF SYSTEMS    (2-9 systems for level 4, 10 or more for level 5)     Review of Systems   Constitutional:  Negative for chills and fever.   HENT:  Negative for congestion, ear pain and sore throat.    Eyes:  Negative for pain.   Respiratory:  Negative for cough and shortness of breath.    Cardiovascular:  Negative for chest pain.   Gastrointestinal:  Negative for abdominal pain, diarrhea, nausea and vomiting.   Genitourinary:  Negative for dysuria and flank pain.   Musculoskeletal:  Positive for neck pain. Negative for myalgias.   Skin:  Negative for rash.   Neurological:  Negative for dizziness and headaches.   Hematological:  Negative for adenopathy.       Except as noted above the remainder of the review of systems was reviewed and negative.       PAST MEDICAL HISTORY     Past Medical History:   Diagnosis Date    Nose fracture     Supervision of normal first pregnancy, antepartum 10/08/2021    Intrauterine pregnancy with the

## 2025-04-09 ENCOUNTER — OFFICE VISIT (OUTPATIENT)
Age: 30
End: 2025-04-09
Payer: OTHER GOVERNMENT

## 2025-04-09 VITALS
SYSTOLIC BLOOD PRESSURE: 100 MMHG | WEIGHT: 199.4 LBS | OXYGEN SATURATION: 98 % | DIASTOLIC BLOOD PRESSURE: 69 MMHG | TEMPERATURE: 98.5 F | HEIGHT: 66 IN | HEART RATE: 77 BPM | BODY MASS INDEX: 32.05 KG/M2

## 2025-04-09 DIAGNOSIS — Z00.00 ENCOUNTER FOR ADULT WELLNESS VISIT: ICD-10-CM

## 2025-04-09 DIAGNOSIS — L65.9 HAIR LOSS: ICD-10-CM

## 2025-04-09 DIAGNOSIS — R53.83 FATIGUE, UNSPECIFIED TYPE: Primary | ICD-10-CM

## 2025-04-09 DIAGNOSIS — R23.8 OTHER SKIN CHANGES: ICD-10-CM

## 2025-04-09 DIAGNOSIS — R63.5 WEIGHT GAIN: ICD-10-CM

## 2025-04-09 PROCEDURE — 99395 PREV VISIT EST AGE 18-39: CPT | Performed by: STUDENT IN AN ORGANIZED HEALTH CARE EDUCATION/TRAINING PROGRAM

## 2025-04-09 PROCEDURE — 99213 OFFICE O/P EST LOW 20 MIN: CPT | Performed by: STUDENT IN AN ORGANIZED HEALTH CARE EDUCATION/TRAINING PROGRAM

## 2025-04-09 SDOH — ECONOMIC STABILITY: FOOD INSECURITY: WITHIN THE PAST 12 MONTHS, THE FOOD YOU BOUGHT JUST DIDN'T LAST AND YOU DIDN'T HAVE MONEY TO GET MORE.: NEVER TRUE

## 2025-04-09 SDOH — ECONOMIC STABILITY: FOOD INSECURITY: WITHIN THE PAST 12 MONTHS, YOU WORRIED THAT YOUR FOOD WOULD RUN OUT BEFORE YOU GOT MONEY TO BUY MORE.: NEVER TRUE

## 2025-04-09 ASSESSMENT — PATIENT HEALTH QUESTIONNAIRE - PHQ9
SUM OF ALL RESPONSES TO PHQ QUESTIONS 1-9: 2
SUM OF ALL RESPONSES TO PHQ QUESTIONS 1-9: 2
2. FEELING DOWN, DEPRESSED OR HOPELESS: SEVERAL DAYS
1. LITTLE INTEREST OR PLEASURE IN DOING THINGS: SEVERAL DAYS
SUM OF ALL RESPONSES TO PHQ QUESTIONS 1-9: 2
SUM OF ALL RESPONSES TO PHQ QUESTIONS 1-9: 2

## 2025-04-09 NOTE — PROGRESS NOTES
Kristin Martinez is a 29 y.o. female      Chief Complaint   Patient presents with    Annual Exam     Fatigue even though gets 9 hours of sleep, dentist told her her thyroid was painful.  Skin dryness of face. Throat itches at night.  \"Winded easily\".         \"Have you been to the ER, urgent care clinic since your last visit?  Hospitalized since your last visit?\"    NO    “Have you seen or consulted any other health care providers outside of [facility] since your last visit?”    NO     “Have you had a pap smear?”    YES - Where: 2023 [facility] Nurse/CMA to request most recent records if not in the chart    Date of last Cervical Cancer screen (HPV or PAP): 12/24/2019             Click Here for Release of Records Request    Vitals:    04/09/25 1504   BP: 100/69   BP Site: Right Upper Arm   Patient Position: Sitting   BP Cuff Size: Large Adult   Pulse: 77   Temp: 98.5 °F (36.9 °C)   TempSrc: Oral   SpO2: 98%   Weight: 90.4 kg (199 lb 6.4 oz)   Height: 1.676 m (5' 5.98\")           Medication Reconciliation Completed, changes notes. Please Update medication list.

## 2025-04-09 NOTE — PROGRESS NOTES
[facility] Clinic Note      History of Present Illness:     Chief Complaint   Patient presents with    Annual Exam     Fatigue even though gets 9 hours of sleep, dentist told her her thyroid was painful.  Skin dryness of face. Throat itches at night.  \"Winded easily\".         Kristin Martinez is a 29 y.o. female with a PMH notable for nasal bone fracture who presents for routine checkup.    #Thyroid concern - recently went to the dentist and palpated her thyroid, felt tender on exam. Also complains of fatigue, weight gain despite rigorous exercise, does jiujutsu. Has been losing hair, dry skin, pimples. + diarrhea and constipation. Decreased libido. No known hx of thyroid problem.    #Health Maintenance  Behavioral/Social Screening  - Anxiety: denies  - Depression: PHQ2 negative  - Intimate partner violence: denies  - Tobacco: Denies  - EtOH: Denies more than 7 drinks per week, or >4 drinks at at time.  - Illicit drugs: Denies    Infectious Disease Screening and Prevention  - GC/Chlamydia testing: +chlamydia, treated.  - Hepatitis B: NI  - Hepatitis C: UTD  - HIV: UTD  - PrEP: NI  - Latent TB: UTD, negative in the past.  - Syphilis Screening: NI    Cancer Screening  - Personal or family history of breast, ovarian, tubal, or peritoneal cancer: Denies  - Mammogram screening: N  - Pap: Normal in 2023, requesting records  - CRC screening: NI  - Lung Cancer: NI    Chronic Disease Screening and Prevention  - Diet and exercise: provided dietary and physical activity counseling, including reductions in saturated fats, sodium, and sweets/sugars and increased consumption of fruits, vegetables, and whole grains; as well as 90 to 180 minutes per week of moderate to vigorous activity.  - DM screening: NI  - Statin for CVD prevention: NI        PMH (REVIEWED):  Past Medical History:   Diagnosis Date    Nose fracture     Supervision of normal first pregnancy, antepartum 10/08/2021    Intrauterine pregnancy with

## 2025-04-10 LAB
ALBUMIN SERPL-MCNC: 3.7 G/DL (ref 3.5–5)
ALBUMIN/GLOB SERPL: 0.9 (ref 1.1–2.2)
ALP SERPL-CCNC: 112 U/L (ref 45–117)
ALT SERPL-CCNC: 20 U/L (ref 12–78)
ANION GAP SERPL CALC-SCNC: 3 MMOL/L (ref 2–12)
AST SERPL-CCNC: 20 U/L (ref 15–37)
BILIRUB SERPL-MCNC: 0.3 MG/DL (ref 0.2–1)
BUN SERPL-MCNC: 11 MG/DL (ref 6–20)
BUN/CREAT SERPL: 15 (ref 12–20)
CALCIUM SERPL-MCNC: 9.1 MG/DL (ref 8.5–10.1)
CHLORIDE SERPL-SCNC: 106 MMOL/L (ref 97–108)
CO2 SERPL-SCNC: 30 MMOL/L (ref 21–32)
CREAT SERPL-MCNC: 0.73 MG/DL (ref 0.55–1.02)
ERYTHROCYTE [DISTWIDTH] IN BLOOD BY AUTOMATED COUNT: 12.1 % (ref 11.5–14.5)
GLOBULIN SER CALC-MCNC: 3.9 G/DL (ref 2–4)
GLUCOSE SERPL-MCNC: 90 MG/DL (ref 65–100)
HCT VFR BLD AUTO: 43.6 % (ref 35–47)
HGB BLD-MCNC: 14.3 G/DL (ref 11.5–16)
MCH RBC QN AUTO: 28.1 PG (ref 26–34)
MCHC RBC AUTO-ENTMCNC: 32.8 G/DL (ref 30–36.5)
MCV RBC AUTO: 85.8 FL (ref 80–99)
NRBC # BLD: 0 K/UL (ref 0–0.01)
NRBC BLD-RTO: 0 PER 100 WBC
PLATELET # BLD AUTO: 273 K/UL (ref 150–400)
PMV BLD AUTO: 11 FL (ref 8.9–12.9)
POTASSIUM SERPL-SCNC: 4.6 MMOL/L (ref 3.5–5.1)
PROT SERPL-MCNC: 7.6 G/DL (ref 6.4–8.2)
RBC # BLD AUTO: 5.08 M/UL (ref 3.8–5.2)
SODIUM SERPL-SCNC: 139 MMOL/L (ref 136–145)
WBC # BLD AUTO: 7.6 K/UL (ref 3.6–11)

## 2025-04-11 LAB — TSH SERPL DL<=0.05 MIU/L-ACNC: 1.91 UIU/ML (ref 0.45–4.5)

## 2025-04-22 ENCOUNTER — PATIENT MESSAGE (OUTPATIENT)
Age: 30
End: 2025-04-22

## 2025-04-24 ENCOUNTER — OFFICE VISIT (OUTPATIENT)
Age: 30
End: 2025-04-24
Payer: OTHER GOVERNMENT

## 2025-04-24 VITALS
WEIGHT: 203.2 LBS | TEMPERATURE: 98.3 F | DIASTOLIC BLOOD PRESSURE: 70 MMHG | SYSTOLIC BLOOD PRESSURE: 113 MMHG | BODY MASS INDEX: 32.66 KG/M2 | OXYGEN SATURATION: 98 % | HEIGHT: 66 IN | HEART RATE: 74 BPM

## 2025-04-24 DIAGNOSIS — R53.83 OTHER FATIGUE: ICD-10-CM

## 2025-04-24 DIAGNOSIS — N89.8 VAGINAL DRYNESS: ICD-10-CM

## 2025-04-24 DIAGNOSIS — N92.6 MENSTRUAL IRREGULARITY: ICD-10-CM

## 2025-04-24 DIAGNOSIS — R63.5 WEIGHT GAIN: Primary | ICD-10-CM

## 2025-04-24 LAB
HBA1C MFR BLD: 5 %
HCG, PREGNANCY, URINE, POC: NEGATIVE
VALID INTERNAL CONTROL, POC: YES

## 2025-04-24 PROCEDURE — 99213 OFFICE O/P EST LOW 20 MIN: CPT | Performed by: STUDENT IN AN ORGANIZED HEALTH CARE EDUCATION/TRAINING PROGRAM

## 2025-04-24 PROCEDURE — 81025 URINE PREGNANCY TEST: CPT | Performed by: STUDENT IN AN ORGANIZED HEALTH CARE EDUCATION/TRAINING PROGRAM

## 2025-04-24 PROCEDURE — PBSHW AMB POC HEMOGLOBIN A1C: Performed by: STUDENT IN AN ORGANIZED HEALTH CARE EDUCATION/TRAINING PROGRAM

## 2025-04-24 PROCEDURE — PBSHW AMB POC URINE PREGNANCY TEST, VISUAL COLOR COMPARISON: Performed by: STUDENT IN AN ORGANIZED HEALTH CARE EDUCATION/TRAINING PROGRAM

## 2025-04-24 PROCEDURE — 83036 HEMOGLOBIN GLYCOSYLATED A1C: CPT | Performed by: STUDENT IN AN ORGANIZED HEALTH CARE EDUCATION/TRAINING PROGRAM

## 2025-04-24 RX ORDER — CHOLECALCIFEROL (VITAMIN D3) 1250 MCG
CAPSULE ORAL
COMMUNITY

## 2025-04-24 NOTE — PROGRESS NOTES
Kristin Martinez is a 29 y.o. female      Chief Complaint   Patient presents with    Follow-up     Insomnia improved, fatigue and gaining weight. Vaginal dryness. Last period lasted 2 days was veyr heavy and atypical.       \"Have you been to the ER, urgent care clinic since your last visit?  Hospitalized since your last visit?\"    NO    “Have you seen or consulted any other health care providers outside of [facility] since your last visit?”    NO     “Have you had a pap smear?”    YES - Where: 2022 [facility] Nurse/CMA to request most recent records if not in the chart    Date of last Cervical Cancer screen (HPV or PAP): 12/24/2019             Click Here for Release of Records Request    Vitals:    04/24/25 1108   BP: 113/70   BP Site: Right Upper Arm   Patient Position: Sitting   BP Cuff Size: Large Adult   Pulse: 74   Temp: 98.3 °F (36.8 °C)   TempSrc: Oral   SpO2: 98%   Weight: 92.2 kg (203 lb 3.2 oz)   Height: 1.676 m (5' 5.98\")           Medication Reconciliation Completed, changes notes. Please Update medication list.

## 2025-04-24 NOTE — PROGRESS NOTES
[facility] Clinic Note      History of Present Illness:     Chief Complaint   Patient presents with    Follow-up     Insomnia improved, fatigue and gaining weight. Vaginal dryness. Last period lasted 2 days was very heavy and atypical.       Kristin Martinez is a 29 y.o. female with no significant PMH who presents for followup:    #fatigue, weight gain, hair loss, decreased libido, vaginal dryness - no change in symptoms. Has gained 4 lbs since last visit. States mood has been ok, though does endorse some anxiety, stress. Not depressed, no anhedonia.      PMH (REVIEWED):  Past Medical History:   Diagnosis Date    Nose fracture     Supervision of normal first pregnancy, antepartum 10/08/2021    Intrauterine pregnancy with the following problems identified:  EDC 5/11/2021 - IVF  NIPTS-NIPTS low fetal fraction - repeat at next visit- normal male  Horizon - neg  Flu vaccine 10/6/21  Declines covid vaccine  AB screen pos for autoantibodies - need 5 pink tops for AB screen at 39   weeks and draw on admission to L&D  - NEEDS 5 PINK TOPS FOR AB SCREEN   BMI >40 MFM at 34 weeks  AFP- neg          Current Medications/Allergies (REVIEWED):     Current Outpatient Medications on File Prior to Visit   Medication Sig Dispense Refill    Cholecalciferol (VITAMIN D3) 1.25 MG (03302 UT) CAPS Take by mouth      MAGNESIUM PO Take by mouth      acetaminophen (TYLENOL) 500 MG tablet Take 1 tablet by mouth every 6 hours as needed for Pain       No current facility-administered medications on file prior to visit.       Allergies   Allergen Reactions    Ibuprofen Hives    Nsaids Hives         Review of Systems:     Review of Systems as per HPI    Objective:     Vitals:    04/24/25 1108   BP: 113/70   BP Site: Right Upper Arm   Patient Position: Sitting   BP Cuff Size: Large Adult   Pulse: 74   Temp: 98.3 °F (36.8 °C)   TempSrc: Oral   SpO2: 98%   Weight: 92.2 kg (203 lb 3.2 oz)   Height: 1.676 m (5' 5.98\")       Physical

## 2025-04-29 DIAGNOSIS — N92.6 MENSTRUAL IRREGULARITY: ICD-10-CM

## 2025-04-29 DIAGNOSIS — R53.83 OTHER FATIGUE: ICD-10-CM

## 2025-04-29 DIAGNOSIS — R63.5 WEIGHT GAIN: ICD-10-CM

## 2025-04-29 DIAGNOSIS — N89.8 VAGINAL DRYNESS: ICD-10-CM

## 2025-05-02 LAB
COLLECT DURATION TIME UR: 24 HR
CORTIS F 24H UR-MRATE: 44 UG/24 HR (ref 6–42)
CORTIS F UR-MCNC: 19 UG/L
SPECIMEN VOL ?TM UR: 2300 ML

## 2025-05-05 ENCOUNTER — RESULTS FOLLOW-UP (OUTPATIENT)
Age: 30
End: 2025-05-05

## 2025-05-05 DIAGNOSIS — R82.998 ELEVATED URINARY FREE CORTISOL LEVEL: Primary | ICD-10-CM

## 2025-05-05 DIAGNOSIS — R53.83 FATIGUE, UNSPECIFIED TYPE: ICD-10-CM

## 2025-05-05 DIAGNOSIS — N89.8 VAGINAL DRYNESS: ICD-10-CM

## 2025-05-05 DIAGNOSIS — N92.6 MENSTRUAL IRREGULARITY: ICD-10-CM

## 2025-05-05 DIAGNOSIS — R63.5 WEIGHT GAIN: ICD-10-CM

## 2025-05-05 DIAGNOSIS — L65.9 HAIR LOSS: ICD-10-CM

## 2025-05-08 ENCOUNTER — LAB (OUTPATIENT)
Age: 30
End: 2025-05-08

## 2025-05-08 NOTE — PROGRESS NOTES
Gave patient 24 hour urine container with instructions. I called and ordered the [place]iv[place] collection kits for salivary cortisol test.

## 2025-05-09 ENCOUNTER — LAB (OUTPATIENT)
Age: 30
End: 2025-05-09

## 2025-05-21 DIAGNOSIS — N92.6 MENSTRUAL IRREGULARITY: ICD-10-CM

## 2025-05-21 DIAGNOSIS — L65.9 HAIR LOSS: ICD-10-CM

## 2025-05-21 DIAGNOSIS — R82.998 ELEVATED URINARY FREE CORTISOL LEVEL: ICD-10-CM

## 2025-05-21 DIAGNOSIS — R63.5 WEIGHT GAIN: ICD-10-CM

## 2025-05-21 DIAGNOSIS — N89.8 VAGINAL DRYNESS: ICD-10-CM

## 2025-05-21 DIAGNOSIS — R53.83 FATIGUE, UNSPECIFIED TYPE: ICD-10-CM

## 2025-05-23 LAB
COLLECT DURATION TIME UR: 24 HR
CORTIS F 24H UR-MRATE: 30 UG/24 HR (ref 6–42)
CORTIS F UR-MCNC: 10 UG/L
SPECIMEN VOL ?TM UR: NORMAL ML

## 2025-05-30 ENCOUNTER — RESULTS FOLLOW-UP (OUTPATIENT)
Age: 30
End: 2025-05-30

## 2025-05-30 LAB — CORTIS SAL-MCNC: <0.01 UG/DL

## 2025-05-31 LAB — CORTIS SAL-MCNC: <0.01 UG/DL

## 2025-07-28 ENCOUNTER — OFFICE VISIT (OUTPATIENT)
Age: 30
End: 2025-07-28
Payer: OTHER GOVERNMENT

## 2025-07-28 VITALS
BODY MASS INDEX: 32.14 KG/M2 | HEART RATE: 79 BPM | WEIGHT: 200 LBS | TEMPERATURE: 98.3 F | RESPIRATION RATE: 18 BRPM | OXYGEN SATURATION: 98 % | SYSTOLIC BLOOD PRESSURE: 99 MMHG | DIASTOLIC BLOOD PRESSURE: 67 MMHG | HEIGHT: 66 IN

## 2025-07-28 DIAGNOSIS — N92.6 MENSTRUAL IRREGULARITY: Primary | ICD-10-CM

## 2025-07-28 PROCEDURE — 99213 OFFICE O/P EST LOW 20 MIN: CPT

## 2025-07-28 RX ORDER — DROSPIRENONE AND ETHINYL ESTRADIOL 0.02-3(28)
1 KIT ORAL DAILY
Qty: 1 PACKET | Refills: 3 | Status: SHIPPED | OUTPATIENT
Start: 2025-07-28

## 2025-07-28 ASSESSMENT — PATIENT HEALTH QUESTIONNAIRE - PHQ9
SUM OF ALL RESPONSES TO PHQ QUESTIONS 1-9: 0
SUM OF ALL RESPONSES TO PHQ QUESTIONS 1-9: 0
1. LITTLE INTEREST OR PLEASURE IN DOING THINGS: NOT AT ALL
SUM OF ALL RESPONSES TO PHQ QUESTIONS 1-9: 0
2. FEELING DOWN, DEPRESSED OR HOPELESS: NOT AT ALL
SUM OF ALL RESPONSES TO PHQ QUESTIONS 1-9: 0

## 2025-07-28 NOTE — PROGRESS NOTES
Kristin Martinez is a 29 y.o. female    Identified pt with two pt identifiers(name and ). Reviewed record in preparation for visit and have obtained necessary documentation.    Chief Complaint   Patient presents with    Menstrual Problem     Patient is coming in for heavy menstrual cycles. She states that she usually gets her period for 3 days but this time she got it for 4 days, stopped and came back on for 1 more day. She states she gets a lot of cramps,  mood swings, acne, stress and not wanting to do anything. This started about 6 months ago.  No other concerns.        \"Have you been to the ER, urgent care clinic since your last visit?  Hospitalized since your last visit?\"    NO    “Have you seen or consulted any other health care providers outside of [facility] since your last visit?”    NO      Vitals:    25 1312   BP: 99/67   BP Site: Right Upper Arm   Patient Position: Sitting   BP Cuff Size: Large Adult   Pulse: 79   Resp: 18   Temp: 98.3 °F (36.8 °C)   TempSrc: Oral   SpO2: 98%   Weight: 90.7 kg (200 lb)   Height: 1.676 m (5' 5.98\")            Health Maintenance Due   Topic Date Due    Hepatitis B vaccine (2 of 3 - 3-dose series) 2010    Varicella vaccine (1 of 2 - 13+ 2-dose series) Never done    Pap smear  2022    COVID-19 Vaccine ( season) Never done       Click Here for Release of Records Request     Medication Reconciliation completed, changes noted.  Please  Update medication list.

## 2025-07-28 NOTE — PROGRESS NOTES
[facility]   [address] [place] [address]   Office [phone], Fax [phone]      Chief Complaint:     Chief Complaint   Patient presents with    Menstrual Problem     Patient is coming in for heavy menstrual cycles. She states that she usually gets her period for 3 days but this time she got it for 4 days, stopped and came back on for 1 more day. She states she gets a lot of cramps,  mood swings, acne, stress and not wanting to do anything. This started about 6 months ago.  No other concerns.            Subjective:   HPI:  Kristin Martinez is a 29 y.o. female that presents for:    LMP: 7/7/25-7/10/25, stopped for a day  Heavier and longer for the past 6 months  Mood swings, decreased libido, fatigue  G1PO   has metastatic cancer, so not using any contraception. Is interested to try BC pills.     I reviewed labs and imaging from prior visits. Mildly elevated 24h urinary cortisol, salivary and free urine cortisol wnl. Was recommended to see Endocrinology and has appt scheduled for 9/2025.     Please note that this dictation was completed with [employer], the computer voice recognition software.  Quite often unanticipated grammatical, syntax, homophones, and other interpretive errors are inadvertently transcribed by the computer software.  Please disregard these errors.  Please excuse any errors that have escaped final proofreading.     Past medical history, social history, medications, and allergies personally reviewed.      Medications:   Current Outpatient Medications   Medication Sig    ASHWAGANDHA PO Take by mouth    Rhodiola rosea (RHODIOLA PO) Take by mouth    Probiotic Product (PROBIOTIC PO) Take by mouth    DIGESTIVE ENZYMES PO Take by mouth    drospirenone-ethinyl estradiol (ANTONIA) 3-0.02 MG per tablet Take 1 tablet by mouth daily    Cholecalciferol (VITAMIN D3) 1.25 MG (17381 UT) CAPS Take by mouth    MAGNESIUM PO Take